# Patient Record
Sex: MALE | Race: WHITE | Employment: OTHER | URBAN - METROPOLITAN AREA
[De-identification: names, ages, dates, MRNs, and addresses within clinical notes are randomized per-mention and may not be internally consistent; named-entity substitution may affect disease eponyms.]

---

## 2017-06-15 PROBLEM — E78.2 MIXED HYPERLIPIDEMIA: Status: ACTIVE | Noted: 2017-06-15

## 2017-06-15 PROBLEM — R63.5 WEIGHT GAIN: Status: ACTIVE | Noted: 2017-06-15

## 2017-06-15 PROBLEM — I10 ESSENTIAL HYPERTENSION: Status: ACTIVE | Noted: 2017-06-15

## 2017-06-15 PROBLEM — E11.8 CONTROLLED DIABETES MELLITUS TYPE 2 WITH COMPLICATIONS (HCC): Status: ACTIVE | Noted: 2017-06-15

## 2017-06-15 PROBLEM — G47.00 INSOMNIA: Status: ACTIVE | Noted: 2017-06-15

## 2017-10-17 PROBLEM — M51.16 LUMBAR DISC DISEASE WITH RADICULOPATHY: Status: ACTIVE | Noted: 2017-10-17

## 2018-03-13 PROBLEM — R63.4 WEIGHT LOSS: Status: ACTIVE | Noted: 2018-03-13

## 2018-10-18 PROBLEM — K64.5 THROMBOSED HEMORRHOIDS: Status: ACTIVE | Noted: 2018-10-18

## 2019-01-23 PROBLEM — R42 DIZZINESS: Status: ACTIVE | Noted: 2019-01-23

## 2019-01-23 PROBLEM — Z91.89 MULTIPLE RISK FACTORS FOR CORONARY ARTERY DISEASE: Status: ACTIVE | Noted: 2019-01-23

## 2019-04-16 ENCOUNTER — HOSPITAL ENCOUNTER (OUTPATIENT)
Dept: MRI IMAGING | Age: 74
Discharge: HOME | End: 2019-04-16
Attending: INTERNAL MEDICINE
Payer: MEDICARE

## 2019-04-16 DIAGNOSIS — M51.16 LUMBAR DISC DISEASE WITH RADICULOPATHY: ICD-10-CM

## 2019-04-16 PROCEDURE — 72158 MRI LUMBAR SPINE W/O & W/DYE: CPT

## 2019-04-16 PROCEDURE — A9575 INJ GADOTERATE MEGLUMI 0.1ML: HCPCS | Performed by: INTERNAL MEDICINE

## 2019-04-16 PROCEDURE — 74011250636 HC RX REV CODE- 250/636: Performed by: INTERNAL MEDICINE

## 2019-04-16 RX ORDER — GADOTERATE MEGLUMINE 376.9 MG/ML
21 INJECTION INTRAVENOUS
Status: COMPLETED | OUTPATIENT
Start: 2019-04-16 | End: 2019-04-16

## 2019-04-16 RX ORDER — SODIUM CHLORIDE 0.9 % (FLUSH) 0.9 %
10 SYRINGE (ML) INJECTION
Status: COMPLETED | OUTPATIENT
Start: 2019-04-16 | End: 2019-04-16

## 2019-04-16 RX ADMIN — Medication 10 ML: at 16:17

## 2019-04-16 RX ADMIN — GADOTERATE MEGLUMINE 21 ML: 376.9 INJECTION INTRAVENOUS at 16:17

## 2019-04-23 NOTE — PROGRESS NOTES
Multilevel disc damage with moderate-severe foraminal narrowing and moderate-severe spinal stenosis. Is pt's back pain improving? If not, refer to Neurosurgery.

## 2019-05-02 ENCOUNTER — HOSPITAL ENCOUNTER (OUTPATIENT)
Dept: CT IMAGING | Age: 74
Discharge: HOME | End: 2019-05-02
Attending: PSYCHIATRY & NEUROLOGY
Payer: MEDICARE

## 2019-05-02 ENCOUNTER — HOSPITAL ENCOUNTER (OUTPATIENT)
Dept: INTERVENTIONAL RADIOLOGY/VASCULAR | Age: 74
Discharge: HOME | End: 2019-05-02
Attending: PSYCHIATRY & NEUROLOGY
Payer: MEDICARE

## 2019-05-02 VITALS
SYSTOLIC BLOOD PRESSURE: 188 MMHG | HEIGHT: 69 IN | RESPIRATION RATE: 19 BRPM | HEART RATE: 88 BPM | DIASTOLIC BLOOD PRESSURE: 92 MMHG | WEIGHT: 230 LBS | BODY MASS INDEX: 34.07 KG/M2 | OXYGEN SATURATION: 99 % | TEMPERATURE: 97.7 F

## 2019-05-02 DIAGNOSIS — M54.16 LUMBAR RADICULOPATHY: ICD-10-CM

## 2019-05-02 PROCEDURE — 72132 CT LUMBAR SPINE W/DYE: CPT

## 2019-05-02 PROCEDURE — 74011250636 HC RX REV CODE- 250/636

## 2019-05-02 PROCEDURE — 74011250636 HC RX REV CODE- 250/636: Performed by: PHYSICIAN ASSISTANT

## 2019-05-02 PROCEDURE — 74011636320 HC RX REV CODE- 636/320: Performed by: PHYSICIAN ASSISTANT

## 2019-05-02 PROCEDURE — 62304 MYELOGRAPHY LUMBAR INJECTION: CPT

## 2019-05-02 PROCEDURE — 77030014143 HC TY PUNC LUMBR BD -A

## 2019-05-02 PROCEDURE — 77030003666 HC NDL SPINAL BD -A

## 2019-05-02 RX ORDER — LIDOCAINE HYDROCHLORIDE 20 MG/ML
20-200 INJECTION, SOLUTION INFILTRATION; PERINEURAL
Status: DISCONTINUED | OUTPATIENT
Start: 2019-05-02 | End: 2019-05-06 | Stop reason: HOSPADM

## 2019-05-02 RX ORDER — MORPHINE SULFATE 2 MG/ML
4 INJECTION, SOLUTION INTRAMUSCULAR; INTRAVENOUS ONCE
Status: COMPLETED | OUTPATIENT
Start: 2019-05-02 | End: 2019-05-02

## 2019-05-02 RX ADMIN — IOPAMIDOL 15 ML: 408 INJECTION, SOLUTION INTRATHECAL at 08:56

## 2019-05-02 RX ADMIN — LIDOCAINE HYDROCHLORIDE 100 MG: 20 INJECTION, SOLUTION INFILTRATION; PERINEURAL at 08:48

## 2019-05-02 RX ADMIN — MORPHINE SULFATE 4 MG: 2 INJECTION, SOLUTION INTRAMUSCULAR; INTRAVENOUS at 10:00

## 2019-05-02 NOTE — DISCHARGE INSTRUCTIONS
[address]  Department of Interventional Radiology  [phone] Office  [phone] Fax  MYELOGRAM DISCHARGE INSTRUCTIONS  General Information:  Myelogram:     A Myelogram involves a lumbar puncture, and instead of removing fluid, contrast will be injected into the sac surrounding the spinal column. It is done to visualize the spinal column, nerve roots, spinal canal, vertebral discs and disc space. It is usually done to diagnose back pain with unknown cause or in preparation for surgery. After the injection, a CT scan will be done, usually within two hours of the injection. Call If:     You should call your Physician and/or the Radiology Nurse if you develop a headache that is not relieved by Tylenol, and worsens when you stand and eases when you lie down, you need to call. You may have developed what is referred to as a spinal headache. Our physician's will probably advise you to be on strict bed rest for 24 hours, to drink lots of fluids and caffeine. If this does not help the head pain, call again the next day. You should call if you have bleeding other than a small spot on your bandage. You should call if you have any numbness, tingling, weakness, fever, chills, urinary retention, severe itching, rash, welts, swelling, or confusion. Follow-Up Instructions: See the doctor who ordered your procedure as he/she has instructed. If you had a Lumbar Puncture or Myelogram, your results should be available to your ordering doctor in 3-5 business days. You can remove your dressing in 24 hours and shower regularly. Do not bathe or swim for 72 hours. To Reach Us: If you have any questions about your procedure, please call the Interventional Radiology department at [phone]. After business hours (5pm) and weekends, call the answering service at [phone] and ask for the Radiologist on call to be paged.      Interventional Radiology General Nurse Discharge      * Please give a list of your current medications to your Primary Care Provider. * Please update this list whenever your medications are discontinued, doses are     changed, or new medications (including over-the-counter products) are added. * Please carry medication information at all times in case of emergency situations. These are general instructions for a healthy lifestyle:    No smoking/ No tobacco products/ Avoid exposure to second hand smoke  Surgeon General's Warning:  Quitting smoking now greatly reduces serious risk to your health. Obesity, smoking, and sedentary lifestyle greatly increases your risk for illness  A healthy diet, regular physical exercise & weight monitoring are important for maintaining a healthy lifestyle    You may be retaining fluid if you have a history of heart failure or if you experience any of the following symptoms:  Weight gain of 3 pounds or more overnight or 5 pounds in a week, increased swelling in our hands or feet or shortness of breath while lying flat in bed. Please call your doctor as soon as you notice any of these symptoms; do not wait until your next office visit. Recognize signs and symptoms of STROKE:  F-face looks uneven    A-arms unable to move or move unevenly    S-speech slurred or non-existent    T-time-call 911 as soon as signs and symptoms begin-DO NOT go       Back to bed or wait to see if you get better-TIME IS BRAIN.         Patient Signature:  Date: 5/2/2019

## 2019-05-02 NOTE — PROGRESS NOTES
TRANSFER - OUT REPORT:    Verbal report given to [facility], RN (name) on Alfonso Gallegos  being transferred to  recovery (unit) for routine progression of care       Report consisted of patients Situation, Background, Assessment and   Recommendations(SBAR). Information from the following report(s) Procedure Summary and MAR was reviewed with the receiving nurse. Lines:       Opportunity for questions and clarification was provided.       Patient transported with:   Registered Nurse

## 2019-07-15 PROBLEM — L98.9 SKIN LESION: Status: ACTIVE | Noted: 2019-07-15

## 2019-07-15 PROBLEM — E55.9 VITAMIN D DEFICIENCY: Status: ACTIVE | Noted: 2019-07-15

## 2019-10-15 PROBLEM — Z23 FLU VACCINE NEED: Status: ACTIVE | Noted: 2019-10-15

## 2019-10-15 PROBLEM — N40.0 BPH (BENIGN PROSTATIC HYPERPLASIA): Status: ACTIVE | Noted: 2019-10-15

## 2021-01-07 PROBLEM — E78.5 HYPERLIPIDEMIA: Status: ACTIVE | Noted: 2017-06-15

## 2021-01-07 PROBLEM — R63.5 WEIGHT GAIN: Status: RESOLVED | Noted: 2017-06-15 | Resolved: 2021-01-07

## 2021-01-07 PROBLEM — R63.4 WEIGHT LOSS: Status: RESOLVED | Noted: 2018-03-13 | Resolved: 2021-01-07

## 2021-01-07 PROBLEM — R42 DIZZINESS: Status: RESOLVED | Noted: 2019-01-23 | Resolved: 2021-01-07

## 2021-01-07 PROBLEM — Z23 FLU VACCINE NEED: Status: RESOLVED | Noted: 2019-10-15 | Resolved: 2021-01-07

## 2021-01-07 PROBLEM — K64.5 THROMBOSED HEMORRHOIDS: Status: RESOLVED | Noted: 2018-10-18 | Resolved: 2021-01-07

## 2021-03-12 PROBLEM — E66.01 OBESITY, MORBID (HCC): Status: ACTIVE | Noted: 2021-03-12

## 2021-07-23 PROBLEM — Z71.89 ENCOUNTER FOR DIABETES EDUCATION: Status: ACTIVE | Noted: 2021-07-23

## 2022-03-18 PROBLEM — Z91.89 MULTIPLE RISK FACTORS FOR CORONARY ARTERY DISEASE: Status: ACTIVE | Noted: 2019-01-23

## 2022-03-18 PROBLEM — E78.5 HYPERLIPIDEMIA: Status: ACTIVE | Noted: 2017-06-15

## 2022-03-19 PROBLEM — E55.9 VITAMIN D DEFICIENCY: Status: ACTIVE | Noted: 2019-07-15

## 2022-03-19 PROBLEM — Z71.89 ENCOUNTER FOR DIABETES EDUCATION: Status: ACTIVE | Noted: 2021-07-23

## 2022-03-19 PROBLEM — E66.01 OBESITY, MORBID (HCC): Status: ACTIVE | Noted: 2021-03-12

## 2022-03-19 PROBLEM — N40.0 BPH (BENIGN PROSTATIC HYPERPLASIA): Status: ACTIVE | Noted: 2019-10-15

## 2022-03-19 PROBLEM — G47.00 INSOMNIA: Status: ACTIVE | Noted: 2017-06-15

## 2022-03-19 PROBLEM — M51.16 LUMBAR DISC DISEASE WITH RADICULOPATHY: Status: ACTIVE | Noted: 2017-10-17

## 2022-03-20 PROBLEM — L98.9 SKIN LESION: Status: ACTIVE | Noted: 2019-07-15

## 2022-03-20 PROBLEM — I10 ESSENTIAL HYPERTENSION: Status: ACTIVE | Noted: 2017-06-15

## 2022-07-19 DIAGNOSIS — E78.5 HYPERLIPIDEMIA, UNSPECIFIED HYPERLIPIDEMIA TYPE: ICD-10-CM

## 2022-07-21 ENCOUNTER — NURSE ONLY (OUTPATIENT)
Dept: INTERNAL MEDICINE CLINIC | Facility: CLINIC | Age: 77
End: 2022-07-21

## 2022-07-21 DIAGNOSIS — E78.5 HYPERLIPIDEMIA, UNSPECIFIED HYPERLIPIDEMIA TYPE: ICD-10-CM

## 2022-07-22 LAB
ALT SERPL-CCNC: 53 U/L (ref 12–65)
ANION GAP SERPL CALC-SCNC: 9 MMOL/L (ref 7–16)
BUN SERPL-MCNC: 25 MG/DL (ref 8–23)
CALCIUM SERPL-MCNC: 9.7 MG/DL (ref 8.3–10.4)
CHLORIDE SERPL-SCNC: 110 MMOL/L (ref 98–107)
CHOLEST SERPL-MCNC: 99 MG/DL
CO2 SERPL-SCNC: 21 MMOL/L (ref 21–32)
CREAT SERPL-MCNC: 1.2 MG/DL (ref 0.8–1.5)
EST. AVERAGE GLUCOSE BLD GHB EST-MCNC: 197 MG/DL
GLUCOSE SERPL-MCNC: 252 MG/DL (ref 65–100)
HBA1C MFR BLD: 8.5 % (ref 4.8–5.6)
HDLC SERPL-MCNC: 33 MG/DL (ref 40–60)
HDLC SERPL: 3 {RATIO}
LDLC SERPL CALC-MCNC: 36.6 MG/DL
POTASSIUM SERPL-SCNC: 4.6 MMOL/L (ref 3.5–5.1)
SODIUM SERPL-SCNC: 140 MMOL/L (ref 136–145)
TRIGL SERPL-MCNC: 147 MG/DL (ref 35–150)
VLDLC SERPL CALC-MCNC: 29.4 MG/DL (ref 6–23)

## 2022-07-28 ENCOUNTER — TELEPHONE (OUTPATIENT)
Dept: INTERNAL MEDICINE CLINIC | Facility: CLINIC | Age: 77
End: 2022-07-28

## 2022-07-28 ENCOUNTER — OFFICE VISIT (OUTPATIENT)
Dept: INTERNAL MEDICINE CLINIC | Facility: CLINIC | Age: 77
End: 2022-07-28
Payer: MEDICARE

## 2022-07-28 VITALS
OXYGEN SATURATION: 97 % | DIASTOLIC BLOOD PRESSURE: 84 MMHG | HEART RATE: 88 BPM | BODY MASS INDEX: 34.07 KG/M2 | SYSTOLIC BLOOD PRESSURE: 132 MMHG | TEMPERATURE: 97.6 F | WEIGHT: 230 LBS | HEIGHT: 69 IN

## 2022-07-28 PROCEDURE — 1123F ACP DISCUSS/DSCN MKR DOCD: CPT | Performed by: INTERNAL MEDICINE

## 2022-07-28 PROCEDURE — 2022F DILAT RTA XM EVC RTNOPTHY: CPT | Performed by: INTERNAL MEDICINE

## 2022-07-28 PROCEDURE — G8427 DOCREV CUR MEDS BY ELIG CLIN: HCPCS | Performed by: INTERNAL MEDICINE

## 2022-07-28 PROCEDURE — 1036F TOBACCO NON-USER: CPT | Performed by: INTERNAL MEDICINE

## 2022-07-28 PROCEDURE — 3052F HG A1C>EQUAL 8.0%<EQUAL 9.0%: CPT | Performed by: INTERNAL MEDICINE

## 2022-07-28 PROCEDURE — G8417 CALC BMI ABV UP PARAM F/U: HCPCS | Performed by: INTERNAL MEDICINE

## 2022-07-28 PROCEDURE — 99213 OFFICE O/P EST LOW 20 MIN: CPT | Performed by: INTERNAL MEDICINE

## 2022-07-28 PROCEDURE — 3017F COLORECTAL CA SCREEN DOC REV: CPT | Performed by: INTERNAL MEDICINE

## 2022-07-28 RX ORDER — REPAGLINIDE 0.5 MG/1
0.5 TABLET ORAL
Qty: 90 TABLET | Refills: 5 | Status: SHIPPED | OUTPATIENT
Start: 2022-07-28 | End: 2022-07-28

## 2022-07-28 RX ORDER — REPAGLINIDE 0.5 MG/1
1 TABLET ORAL
Qty: 120 TABLET | Refills: 5 | Status: SHIPPED | OUTPATIENT
Start: 2022-07-28 | End: 2022-07-29 | Stop reason: SDUPTHER

## 2022-07-28 ASSESSMENT — ENCOUNTER SYMPTOMS
CHEST TIGHTNESS: 0
SHORTNESS OF BREATH: 0

## 2022-07-28 NOTE — TELEPHONE ENCOUNTER
[facility] [phone] pharmacy called. Says there were 2 prescriptions sent in for same medication with different instructions.     Don    Also have qustions regarding dosing

## 2022-07-28 NOTE — TELEPHONE ENCOUNTER
Pharmacy needsClarification on directions and quantity for days supply for Prandin.  Request prescription be resent

## 2022-07-28 NOTE — PROGRESS NOTES
ASSESSMENT/PLAN:    1. Uncontrolled type 2 diabetes mellitus with complication, without long-term current use of insulin (HCC)     - repaglinide (PRANDIN) 0.5 MG tablet; Take 2 tablets by mouth in the morning and 2 tablets at noon and 2 tablets in the evening. Take before meals. Dispense: 120 tablet; Refill: 5    1. Continue chronic medications as prescribed w/ the following Rx changes: uncontrolled, changes made today: Freestyle Vic sensor- Glucose trend with 170s and 180s in mornings. After 12PM, blood sugars stabilize with more reasonable blood sugars in the lower 110s-140s. He likes using the  CGM sensor. Emphasized, continued efforts with limiting carbs, sweets and continued positive lifestyle modifications recommended,  CGM discussion and expectations of data to be obtained and review to control blood sugar. Continue Janumet 50/1000 mg to BID. Increase Prandin to 1 mg BID with meals- Monitor for hypoglycemia, and precautions. If no improvement in 4 weeks, I would like to try SGLT2 in combination with Metformin. I will have him follow up with [facility]      2. Education: Reviewed ABCs of diabetes management (respective goals in parentheses):  A1C (<7), blood pressure (<130/80), and cholesterol (LDL <100). 3.  Compliance at present is estimated to be good. Efforts to improve compliance (if necessary) will be directed at increased exercise, dietary modifications: A healthy diet to consider is a more mediterranean diet which is abundant in fruits, vegetables, whole grains, legumes and olive oil. It features fish and poultry, lean sources of protein over red meat. The importance of a healthy lifestyle are discussed. Limit high fructose containing foods, moderate portion sizes,  regular cardiovascular exercise (walking, swimming, aerobics) for 30-45 minutes, 3-4 times weekly. , regular blood sugar monitoring: daily.   4.  Monitor blood sugar at home daily and keep record to bring to next appointment. 5.  Discussion/Counseling provided today include: interpretation of lab results, blood sugar goals, complications of diabetes mellitus, nutrition and annual eye exams and diabetic foot care precautions. Evaluation and management of the chronic condition(s) delineated. No negative side effects reported. I have reviewed all the lab results. There are some abnormalities that are either expected or not critical to the patient's health, and are discussed in the office today and are addressed. Please refer to the above assessement and plan narrative and orders and follow up plan. Medication discussed and refilled as needed. Physical exam findings are stable unless otherwise indicated and this is addressed. The most recent lab work and imaging and consultant/urgent care visits and imaging are reviewed and discussed and considered during this visit encounter. 1. Uncontrolled type 2 diabetes mellitus with complication, without long-term current use of insulin (HCC)  -     repaglinide (PRANDIN) 0.5 MG tablet; Take 2 tablets by mouth in the morning and 2 tablets at noon and 2 tablets in the evening. Take before meals. , Disp-120 tablet, R-5Normal  -     Microalbumin / creatinine urine ratio; Future       On this date, 22, I have spent 20 minutes reviewing previous notes, test results and face to face with the patient discussing the diagnosis and importance of compliance with the treatment plan as well as documenting on the day of the visit. An electronic signature was used to authenticate this note. -- Katarzyna Guerrero MD     Return in about 4 weeks (around 2022) for follow up with PA for DM check.     SUBJECTIVE/OBJECTIVE:    HPI:   Dedra Henderson (:  is a 78 y.o. male, here for evaluation of the following chief complaint(s):   Chief Complaint   Patient presents with    Diabetes     6 month recheck    Hypertension    Cholesterol Problem    Discuss Labs     Diabetes - Diet: attempting to follow diabetic diet  Lab Results   Component Value Date    LABA1C 8.5 (H) 07/21/2022     Lab Results   Component Value Date     07/21/2022       Current diabetic medications include:   Key Antihyperglycemic Medications            repaglinide (PRANDIN) 0.5 MG tablet (Taking)    Sig - Route: Take 2 tablets by mouth in the morning and 2 tablets at noon and 2 tablets in the evening. Take before meals. - Oral    sitaGLIPtan-metFORMIN (JANUMET)  MG per tablet (Taking)    Class: Historical Med            The patient is a 78 y.o. male who is seen for follow up of diabetes. Current monitoring regimen: BGs are high in the morning, symptomatic hypoglycemia does not occur, no diet changes. Taking Janumet 50/1000 mg BID with meals and Prandin 0.5 mg BID. A1c has trended down some over the last year but still not controlled. .  Glucose monitoring frequency: CGM   Home blood sugar records:  [facility] CGM reviewed  Any episodes of hypoglycemia? no  Known diabetic complications: none     Current Eye Exam (within one year): Yes  Current Urine Microalbumin: Yes,   Is She on ACE inhibitor or angiotensin II receptor blocker? Yes - ramipril (Altace)  Current Foot Exam (within one year): Yes    Weight trend: stable  Prior visit with dietician: Has met with [facility] in the past   Current diet: meals per day on average: well balanced, on average, 2 meals per day, Eats vital and eggs in morning and says he has baked chicken or fish and a salad for dinner and walks for exercise daily and cannot figure out why his blood sugars are not better controlled? Current exercise: walking        Labs reviewed and discussed and medication refilled as needed for chronic medications during ov or adjusted based on lab results and/or our discussion as appropriate. See discussion. The patient's available records and electronic chart records are reviewed.   The PMH, PSH, medications, allergies, medications, FH, health maintenance and vaccination status are all reviewed and updated as appropriate. Records from outside providers have been reviewed, summarized, and considered as noted in the history of present illness, past medical history, and objective data of this note and encounter. Health Maintenance   Topic Date Due    Pneumococcal 65+ years Vaccine (1 - PCV) 08/14/1951    Hepatitis C screen  Never done    Shingles vaccine (1 of 2) Never done    COVID-19 Vaccine (4 - Booster) 06/28/2021    Diabetic microalbuminuria test  03/05/2022    Diabetic retinal exam  08/09/2022    Flu vaccine (1) 09/01/2022    Diabetic foot exam  01/10/2023    Depression Screen  01/10/2023    Annual Wellness Visit (AWV)  01/11/2023    A1C test (Diabetic or Prediabetic)  07/21/2023    Lipids  07/21/2023    DTaP/Tdap/Td vaccine (2 - Td or Tdap) 01/07/2025    Colorectal Cancer Screen  01/01/2026    Hepatitis A vaccine  Aged Out    Hib vaccine  Aged Out    Meningococcal (ACWY) vaccine  Aged Out     Patient Active Problem List   Diagnosis    Hyperlipidemia    Multiple risk factors for coronary artery disease    Vitamin D deficiency    Encounter for diabetes education    Insomnia    Uncontrolled type 2 diabetes mellitus with complication, without long-term current use of insulin (HCC)    Obesity, morbid ([address]    BPH (benign prostatic hyperplasia)    Lumbar disc disease with radiculopathy    Essential hypertension    Skin lesion       Review of Systems   Constitutional:  Negative for activity change and fatigue. Eyes:  Negative for visual disturbance. Respiratory:  Negative for chest tightness and shortness of breath. Cardiovascular:  Negative for chest pain, palpitations and leg swelling. Endocrine: Negative for polydipsia and polyuria. Genitourinary:  Negative for dysuria. Musculoskeletal:  Negative for myalgias. Skin:  Negative for wound. Neurological:  Negative for headaches.    Psychiatric/Behavioral:  Negative for dysphoric mood.      Lab Results   Component Value Date/Time    WBC 8.5 01/03/2022 09:31 AM    HGB 14.1 01/03/2022 09:31 AM    HCT 42.7 01/03/2022 09:31 AM    MCV 96 01/03/2022 09:31 AM    RDW 12.1 01/03/2022 09:31 AM     01/03/2022 09:31 AM    NEUTOPHILPCT 61 01/03/2022 09:31 AM    LYMPHOPCT 30 01/03/2022 09:31 AM    MONOPCT 7 01/03/2022 09:31 AM    EOSRELPCT 1 01/03/2022 09:31 AM    BASOPCT 0 01/03/2022 09:31 AM    LYMPHSABS 2.6 01/03/2022 09:31 AM    MONOSABS 0.6 01/03/2022 09:31 AM    EOSABS 0.1 01/03/2022 09:31 AM    BASOSABS 0.0 01/03/2022 09:31 AM    IMMGRAN 1 01/03/2022 09:31 AM    GRANULOCYTEABSOLUTECOUNT 0.1 01/03/2022 09:31 AM       Lab Results   Component Value Date/Time     07/21/2022 01:47 PM    K 4.6 07/21/2022 01:47 PM     07/21/2022 01:47 PM    CO2 21 07/21/2022 01:47 PM    ANIONGAP 9 07/21/2022 01:47 PM    GLUCOSE 252 07/21/2022 01:47 PM    BUN 25 07/21/2022 01:47 PM    CREATININE 1.20 07/21/2022 01:47 PM    GFRAA >60 07/21/2022 01:47 PM    LABGLOM >60 07/21/2022 01:47 PM    CALCIUM 9.7 07/21/2022 01:47 PM    BILITOT 0.5 01/03/2022 09:31 AM    ALT 53 07/21/2022 01:47 PM    AST 24 01/03/2022 09:31 AM    ALKPHOS 65 01/03/2022 09:31 AM    PROT 6.6 01/03/2022 09:31 AM    LABALBU 4.3 01/03/2022 09:31 AM       Lab Results   Component Value Date/Time    CHOL 99 07/21/2022 01:47 PM    HDL 33 07/21/2022 01:47 PM    TRIG 147 07/21/2022 01:47 PM    LDLCALC 36.6 07/21/2022 01:47 PM    VLDL 23 01/03/2022 09:31 AM       Lab Results   Component Value Date/Time    LABA1C 8.5 07/21/2022 01:47 PM    LABA1C 8.3 01/03/2022 09:31 AM    LABA1C 8.9 07/15/2021 08:09 AM    LABA1C 8.9 03/05/2021 11:01 AM    LABA1C 9.8 05/22/2020 03:43 PM       Lab Results   Component Value Date/Time    TSH 1.970 05/22/2020 03:43 PM    TSH 2.970 02/11/2020 09:17 AM    TSH 2.100 07/10/2019 09:08 AM       Lab Results   Component Value Date/Time    PSA 1.7 02/11/2020 09:17 AM       Lab Results   Component Value Date/Time    SPECGRAV 1.022 01/03/2022 09:31 AM    PHUR 5.5 01/03/2022 09:31 AM    COLORU Yellow 01/03/2022 09:31 AM    CLARITYU Clear 01/03/2022 09:31 AM    LEUKOCYTESUR Negative 01/03/2022 09:31 AM    PROTEINU Negative 01/03/2022 09:31 AM    GLUCOSEU 3+ 01/03/2022 09:31 AM    KETUA Negative 01/03/2022 09:31 AM    BLOODU Negative 01/03/2022 09:31 AM    BILIRUBINUR Negative 01/03/2022 09:31 AM    UROBILINOGEN 0.2 01/03/2022 09:31 AM    NITRU Negative 01/03/2022 09:31 AM    LABMICR Comment 01/03/2022 09:31 AM           Vitals:    07/28/22 0815   BP: 132/84   Site: Left Upper Arm   Position: Sitting   Cuff Size: Small Adult   Pulse: 88   Temp: 97.6 °F (36.4 °C)   TempSrc: Temporal   SpO2: 97%   Weight: 230 lb (104.3 kg)   Height: 5' 8.5\" (1.74 m)     Wt Readings from Last 3 Encounters:   07/28/22 230 lb (104.3 kg)   01/10/22 230 lb (104.3 kg)   08/06/21 228 lb (103.4 kg)     BP Readings from Last 3 Encounters:   07/28/22 132/84   01/10/22 138/82   08/06/21 130/70     Physical Exam  Vitals and nursing note reviewed. Constitutional:       Appearance: Normal appearance. He is not ill-appearing. HENT:      Head: Normocephalic and atraumatic. Eyes:      Extraocular Movements: Extraocular movements intact. Conjunctiva/sclera: Conjunctivae normal.   Cardiovascular:      Rate and Rhythm: Normal rate. Pulmonary:      Effort: Pulmonary effort is normal.   Neurological:      General: No focal deficit present. Mental Status: He is alert and oriented to person, place, and time. Mental status is at baseline.    Psychiatric:         Mood and Affect: Mood normal.         Behavior: Behavior normal.

## 2022-07-29 RX ORDER — REPAGLINIDE 0.5 MG/1
1 TABLET ORAL
Qty: 120 TABLET | Refills: 5 | Status: SHIPPED | OUTPATIENT
Start: 2022-07-29

## 2022-08-30 ENCOUNTER — OFFICE VISIT (OUTPATIENT)
Dept: INTERNAL MEDICINE CLINIC | Facility: CLINIC | Age: 77
End: 2022-08-30
Payer: COMMERCIAL

## 2022-08-30 VITALS
SYSTOLIC BLOOD PRESSURE: 150 MMHG | HEART RATE: 79 BPM | DIASTOLIC BLOOD PRESSURE: 88 MMHG | HEIGHT: 69 IN | BODY MASS INDEX: 34.07 KG/M2 | OXYGEN SATURATION: 97 % | TEMPERATURE: 97 F | WEIGHT: 230 LBS

## 2022-08-30 DIAGNOSIS — I10 ELEVATED BLOOD PRESSURE READING IN OFFICE WITH DIAGNOSIS OF HYPERTENSION: ICD-10-CM

## 2022-08-30 DIAGNOSIS — E11.9 TYPE 2 DIABETES MELLITUS WITHOUT COMPLICATION, WITHOUT LONG-TERM CURRENT USE OF INSULIN (HCC): Primary | ICD-10-CM

## 2022-08-30 DIAGNOSIS — E66.1 CLASS 1 DRUG-INDUCED OBESITY WITH SERIOUS COMORBIDITY AND BODY MASS INDEX (BMI) OF 34.0 TO 34.9 IN ADULT: ICD-10-CM

## 2022-08-30 PROCEDURE — 3052F HG A1C>EQUAL 8.0%<EQUAL 9.0%: CPT | Performed by: PHYSICIAN ASSISTANT

## 2022-08-30 PROCEDURE — 3017F COLORECTAL CA SCREEN DOC REV: CPT | Performed by: PHYSICIAN ASSISTANT

## 2022-08-30 PROCEDURE — 1123F ACP DISCUSS/DSCN MKR DOCD: CPT | Performed by: PHYSICIAN ASSISTANT

## 2022-08-30 PROCEDURE — 1036F TOBACCO NON-USER: CPT | Performed by: PHYSICIAN ASSISTANT

## 2022-08-30 PROCEDURE — G8427 DOCREV CUR MEDS BY ELIG CLIN: HCPCS | Performed by: PHYSICIAN ASSISTANT

## 2022-08-30 PROCEDURE — G8417 CALC BMI ABV UP PARAM F/U: HCPCS | Performed by: PHYSICIAN ASSISTANT

## 2022-08-30 PROCEDURE — 2022F DILAT RTA XM EVC RTNOPTHY: CPT | Performed by: PHYSICIAN ASSISTANT

## 2022-08-30 PROCEDURE — 99215 OFFICE O/P EST HI 40 MIN: CPT | Performed by: PHYSICIAN ASSISTANT

## 2022-08-30 RX ORDER — METFORMIN HYDROCHLORIDE 500 MG/1
500 TABLET, EXTENDED RELEASE ORAL 2 TIMES DAILY WITH MEALS
Qty: 60 TABLET | Refills: 2 | Status: SHIPPED | OUTPATIENT
Start: 2022-08-30

## 2022-08-30 ASSESSMENT — PATIENT HEALTH QUESTIONNAIRE - PHQ9
SUM OF ALL RESPONSES TO PHQ QUESTIONS 1-9: 0
SUM OF ALL RESPONSES TO PHQ QUESTIONS 1-9: 0
SUM OF ALL RESPONSES TO PHQ9 QUESTIONS 1 & 2: 0
SUM OF ALL RESPONSES TO PHQ QUESTIONS 1-9: 0
SUM OF ALL RESPONSES TO PHQ QUESTIONS 1-9: 0
2. FEELING DOWN, DEPRESSED OR HOPELESS: 0
1. LITTLE INTEREST OR PLEASURE IN DOING THINGS: 0

## 2022-08-30 ASSESSMENT — ENCOUNTER SYMPTOMS: SHORTNESS OF BREATH: 0

## 2022-08-30 NOTE — PATIENT INSTRUCTIONS
Discontinue Janumet once current supply is finished  Replace with Rybelsus 3 mg once daily in the morning + Metformin  mg twice daily with meals  Advised to increase to Metformin ER 1000 mg (2 x 500 mg tablets) twice daily if no diarrhea or stomach problems occur on the lower dose  Reviewed specific dosing instructions for Rybelsus including taking it on an empty stomach when first waking up, swallow with a small amount of water - no more than 4 oz, and wait 30 minutes before eating any food, drinking beverages, or taking other medications  Monitor blood sugar daily and keep record to bring to next appointment  Continue chronic medications as prescribed  Suggest moving daily honey dose to earlier in the day - avoid evening hours after dinner  Recommend reduction of ice cream intake frequency but also need to switch to lower carbohydrate option like greek yogurt bars (16 g of carb vs 50 g in bars he's been eating) and have it within an hour of dinner

## 2022-08-30 NOTE — PROGRESS NOTES
Preeti Mcdermott (:  ) is a 78 y.o. male,Established patient, here for evaluation of the following chief complaint(s):  Follow-up (Diabetes)         ASSESSMENT/PLAN:  1. Type 2 diabetes mellitus without complication, without long-term current use of insulin (HCC)  2. Elevated blood pressure reading in office with diagnosis of hypertension  3. Class 1 drug-induced obesity with serious comorbidity and body mass index (BMI) of 34.0 to 34.9 in adult      Patient Instructions   Discontinue Janumet once current supply is finished  Replace with Rybelsus 3 mg once daily in the morning + Metformin  mg twice daily with meals  Advised to increase to Metformin ER 1000 mg (2 x 500 mg tablets) twice daily if no diarrhea or stomach problems occur on the lower dose  Reviewed specific dosing instructions for Rybelsus including taking it on an empty stomach when first waking up, swallow with a small amount of water - no more than 4 oz, and wait 30 minutes before eating any food, drinking beverages, or taking other medications  Monitor blood sugar daily and keep record to bring to next appointment  Continue chronic medications as prescribed  Suggest moving daily honey dose to earlier in the day - avoid evening hours after dinner  Recommend reduction of ice cream intake frequency but also need to switch to lower carbohydrate option like greek yogurt bars (16 g of carb vs 50 g in bars he's been eating) and have it within an hour of dinner      Return in about 1 month (around 2022) for diabetes f/u. Subjective   SUBJECTIVE/OBJECTIVE:  The patient is a 78 y.o. male who is seen for follow up of diabetes. Current monitoring regimen: BGs are high in the morning.   Glucose monitoring frequency: 1 times daily  Home blood sugar records: fasting range: 220-230, pre-dinner range: 110-130  Any episodes of hypoglycemia? no  Known diabetic complications: peripheral neuropathy  Current diabetic medications include: oral agents (triple therapy): repaglinide (Prandin) 1 mg (2 x 0.5 mg tablets) bid with meals - has been more consistent with taking this med over the past month, combination: Janumet 50/1000 mg bid. Previous treatments tried: plain Metformin caused diarrhea, Actos in 2017    Current Eye Exam (within one year): Yes - Aug 2022  Current Urine Microalbumin: No, normal - Mar 2021  Is She on ACE inhibitor or angiotensin II receptor blocker? Yes - ramipril (Altace)  Current Foot Exam (within one year): Yes - Jan 2022      Weight trend: stable  Prior visit with dietician: no  Current diet: meals per day on average: 2; restricting intake of the following: carbohydrates; admits to [employer] ice cream bar frequently in the late evenings  Current exercise: walking        Last HbA1C:    Lab Results   Component Value Date    LABA1C 8.5 (H) 07/21/2022     Lab Results   Component Value Date     07/21/2022           Last Lipid Panel:   Lab Results   Component Value Date    CHOL 99 07/21/2022    CHOL 108 01/03/2022    CHOL 107 03/05/2021     Lab Results   Component Value Date    TRIG 147 07/21/2022    TRIG 126 01/03/2022    TRIG 105 03/05/2021     Lab Results   Component Value Date    HDL 33 (L) 07/21/2022    HDL 35 (L) 01/03/2022    HDL 38 (L) 03/05/2021     Lab Results   Component Value Date    LDLCALC 36.6 07/21/2022    LDLCALC 50 01/03/2022    LDLCALC 49 03/05/2021     Lab Results   Component Value Date    LABVLDL 29.4 (H) 07/21/2022    LABVLDL 21 05/22/2020    LABVLDL 27 02/11/2020    VLDL 23 01/03/2022    VLDL 20 03/05/2021     Lab Results   Component Value Date    CHOLHDLRATIO 3.0 07/21/2022        Review of Systems   Constitutional:  Negative for fatigue and unexpected weight change. Respiratory:  Negative for shortness of breath. Cardiovascular:  Negative for chest pain, palpitations and leg swelling. Endocrine: Negative for polydipsia and polyuria. Musculoskeletal:  Negative for myalgias. Neurological:  Negative for dizziness, numbness and headaches. BP (!) 150/85 (Site: Right Upper Arm, Position: Sitting, Cuff Size: Small Adult)   Pulse 79   Temp 97 °F (36.1 °C) (Temporal)   Ht 5' 8.5\" (1.74 m)   Wt 230 lb (104.3 kg)   SpO2 97%   BMI 34.46 kg/m²       BP (!) 150/88   Pulse 79   Temp 97 °F (36.1 °C) (Temporal)   Ht 5' 8.5\" (1.74 m)   Wt 230 lb (104.3 kg)   SpO2 97%   BMI 34.46 kg/m²       Objective   Physical Exam  Constitutional:       Appearance: Normal appearance. HENT:      Head: Normocephalic and atraumatic. Eyes:      Conjunctiva/sclera: Conjunctivae normal.      Pupils: Pupils are equal, round, and reactive to light. Neck:      Vascular: No carotid bruit. Cardiovascular:      Rate and Rhythm: Normal rate and regular rhythm. Heart sounds: Normal heart sounds. Pulmonary:      Effort: Pulmonary effort is normal.      Breath sounds: Normal breath sounds. Musculoskeletal:         General: Normal range of motion. Cervical back: Normal range of motion. Skin:     General: Skin is warm and dry. Neurological:      Mental Status: He is alert and oriented to person, place, and time. Psychiatric:         Mood and Affect: Mood normal.         Behavior: Behavior normal.         Thought Content: Thought content normal.         Judgment: Judgment normal.          On this date 8/30/2022 I have spent 60 minutes reviewing previous notes, test results and face to face with the patient discussing the diagnosis and importance of compliance with the treatment plan as well as documenting on the day of the visit. An electronic signature was used to authenticate this note.     --Ty Rey PA-C

## 2022-09-07 ENCOUNTER — TELEPHONE (OUTPATIENT)
Dept: INTERNAL MEDICINE CLINIC | Facility: CLINIC | Age: 77
End: 2022-09-07

## 2022-09-07 NOTE — TELEPHONE ENCOUNTER
Pt was instructed to schedule a 1 month f/u with you after his visit on 8/30/22 for a DM med re-check. No appts available. Please advise.

## 2022-09-07 NOTE — TELEPHONE ENCOUNTER
----- Message from Rianna Geller sent at 9/7/2022  9:24 AM EDT -----  Subject: Appointment Request    Reason for Call: Established Patient Appointment needed: Routine Existing   Condition Follow Up (Diabetes)    QUESTIONS    Reason for appointment request? No appointments available during search     Additional Information for Provider?  Patient was instructed to follow up   in one month after started new diabetic medication No appointment showing   ---------------------------------------------------------------------------  --------------  [id number] [employer]  [phone]; OK to leave message on voicemail  ---------------------------------------------------------------------------  --------------  SCRIPT ANSWERS  COVID Screen: Livia Jean-Baptiste

## 2022-09-08 NOTE — TELEPHONE ENCOUNTER
Pt sent a [employer] msg today requesting an appt and I called him because there was a cancellation on Sept 20 and he declined that appt. He states that is too soon and he wants to be worked in later in the month or the first part of October but before October 7. Please advise.

## 2022-09-08 NOTE — TELEPHONE ENCOUNTER
He is trying to coordinate his appointment to be 30 days after starting a new medication but was finishing up his old medication first so that's what the dilemma is. Due to a scheduled conference I am attending the first week of October, the only option I have is working him in at 2:30 on Monday, Sept 26 or giving some additional samples and waiting until the week I get back and could do a 2:30 on Monday, Oct 10 or Thursday, Oct 13.

## 2022-09-26 ENCOUNTER — OFFICE VISIT (OUTPATIENT)
Dept: INTERNAL MEDICINE CLINIC | Facility: CLINIC | Age: 77
End: 2022-09-26
Payer: COMMERCIAL

## 2022-09-26 VITALS
OXYGEN SATURATION: 98 % | HEIGHT: 69 IN | WEIGHT: 229 LBS | BODY MASS INDEX: 33.92 KG/M2 | TEMPERATURE: 97.3 F | SYSTOLIC BLOOD PRESSURE: 132 MMHG | DIASTOLIC BLOOD PRESSURE: 80 MMHG | HEART RATE: 76 BPM

## 2022-09-26 DIAGNOSIS — E11.40 TYPE 2 DIABETES MELLITUS WITH DIABETIC NEUROPATHY, WITHOUT LONG-TERM CURRENT USE OF INSULIN (HCC): Primary | ICD-10-CM

## 2022-09-26 DIAGNOSIS — Z23 NEED FOR INFLUENZA VACCINATION: ICD-10-CM

## 2022-09-26 PROCEDURE — 3052F HG A1C>EQUAL 8.0%<EQUAL 9.0%: CPT | Performed by: PHYSICIAN ASSISTANT

## 2022-09-26 PROCEDURE — 1123F ACP DISCUSS/DSCN MKR DOCD: CPT | Performed by: PHYSICIAN ASSISTANT

## 2022-09-26 PROCEDURE — 90694 VACC AIIV4 NO PRSRV 0.5ML IM: CPT | Performed by: PHYSICIAN ASSISTANT

## 2022-09-26 PROCEDURE — 90471 IMMUNIZATION ADMIN: CPT | Performed by: PHYSICIAN ASSISTANT

## 2022-09-26 PROCEDURE — 99214 OFFICE O/P EST MOD 30 MIN: CPT | Performed by: PHYSICIAN ASSISTANT

## 2022-09-26 ASSESSMENT — ENCOUNTER SYMPTOMS: SHORTNESS OF BREATH: 0

## 2022-09-26 ASSESSMENT — PATIENT HEALTH QUESTIONNAIRE - PHQ9
SUM OF ALL RESPONSES TO PHQ9 QUESTIONS 1 & 2: 0
SUM OF ALL RESPONSES TO PHQ QUESTIONS 1-9: 0
SUM OF ALL RESPONSES TO PHQ QUESTIONS 1-9: 0
1. LITTLE INTEREST OR PLEASURE IN DOING THINGS: 0
SUM OF ALL RESPONSES TO PHQ QUESTIONS 1-9: 0
SUM OF ALL RESPONSES TO PHQ QUESTIONS 1-9: 0
2. FEELING DOWN, DEPRESSED OR HOPELESS: 0

## 2022-09-26 NOTE — PROGRESS NOTES
Germain Fernández (:  ) is a 78 y.o. male,Established patient, here for evaluation of the following chief complaint(s):  Follow-up (Diabetes)         ASSESSMENT/PLAN:  1. Type 2 diabetes mellitus with diabetic neuropathy, without long-term current use of insulin (HCC)  -     CBC with Auto Differential; Future  -     Comprehensive Metabolic Panel; Future  -     Lipid Panel; Future  -     Hemoglobin A1C; Future  2. Need for influenza vaccination  -     Influenza, FLUAD, (age 72 y+), IM, Preservative Free, 0.5 mL      Patient Instructions   Discontinue Rybelsus  Switch to Ozempic 0.5 mg weekly x 4 weeks then increase to 1 mg weekly (use 2 x 0.5 mg doses of sample pen) until used up  Reduce Metformin ER to once daily with largest meal  Counseled that while these medications do usually work well it will take time for them to get to optimal dosing and efficacy  Monitor blood sugar daily and keep record to bring to next appointment  Continue chronic medications as prescribed  Suggest eating something (greek yogurt, protein drink, peanut butter crackers) early when he first wakes up prior to exercising to avoid glucose spikes in the morning  Monitor blood pressure 2 times/week and keep record to bring to next appointment      Return in about 5 weeks (around 10/31/2022) for diabetes f/u. Subjective   SUBJECTIVE/OBJECTIVE:  The patient is a 78 y.o. male who is seen for follow up of diabetes. Current monitoring regimen: BGs are high in the morning. Glucose monitoring frequency: 3 times daily  Home blood sugar records: fasting range: 240-290, mid-afternoon/pre-dinner range: 140-160,  dinner postprandial range: 160s  Any episodes of hypoglycemia? no  Known diabetic complications: peripheral neuropathy  Current diabetic medications include: oral agents (triple therapy): Glucophage  mg bid, repaglinide (Prandin) 1 mg (2 x 0.5 mg tablets) bid with meals and Rybelsus 3 mg daily.   He describes frequent liquid stools in the evening after taking 2nd dose of Metformin   Previous treatments tried: plain Metformin caused diarrhea, Actos in 2017    Last HbA1C:    Lab Results   Component Value Date    LABA1C 8.5 (H) 07/21/2022     Lab Results   Component Value Date     07/21/2022         Last Lipid Panel:   Lab Results   Component Value Date    CHOL 99 07/21/2022    CHOL 108 01/03/2022    CHOL 107 03/05/2021     Lab Results   Component Value Date    TRIG 147 07/21/2022    TRIG 126 01/03/2022    TRIG 105 03/05/2021     Lab Results   Component Value Date    HDL 33 (L) 07/21/2022    HDL 35 (L) 01/03/2022    HDL 38 (L) 03/05/2021     Lab Results   Component Value Date    LDLCALC 36.6 07/21/2022    LDLCALC 50 01/03/2022    LDLCALC 49 03/05/2021     Lab Results   Component Value Date    LABVLDL 29.4 (H) 07/21/2022    LABVLDL 21 05/22/2020    LABVLDL 27 02/11/2020    VLDL 23 01/03/2022    VLDL 20 03/05/2021     Lab Results   Component Value Date    CHOLHDLRATIO 3.0 07/21/2022          Review of Systems   Constitutional:  Negative for fatigue and unexpected weight change. Eyes:  Positive for visual disturbance. Respiratory:  Negative for shortness of breath. Cardiovascular:  Negative for chest pain, palpitations and leg swelling. Endocrine: Negative for polydipsia. Negative for hair loss   Genitourinary:  Negative for frequency. Musculoskeletal:  Negative for myalgias. Neurological:  Negative for dizziness, numbness and headaches. BP (!) 140/90 (Site: Left Upper Arm, Position: Sitting, Cuff Size: Small Adult)   Pulse 76   Temp 97.3 °F (36.3 °C) (Temporal)   Ht 5' 8.5\" (1.74 m)   Wt 229 lb (103.9 kg)   SpO2 98%   BMI 34.31 kg/m²       /80   Pulse 76   Temp 97.3 °F (36.3 °C) (Temporal)   Ht 5' 8.5\" (1.74 m)   Wt 229 lb (103.9 kg)   SpO2 98%   BMI 34.31 kg/m²       Objective   Physical Exam  Constitutional:       Appearance: Normal appearance.    HENT:      Head: Normocephalic and atraumatic. Eyes:      Conjunctiva/sclera: Conjunctivae normal.      Pupils: Pupils are equal, round, and reactive to light. Neck:      Vascular: No carotid bruit. Cardiovascular:      Rate and Rhythm: Normal rate and regular rhythm. Heart sounds: Normal heart sounds. Pulmonary:      Effort: Pulmonary effort is normal.      Breath sounds: Normal breath sounds. Musculoskeletal:         General: Normal range of motion. Cervical back: Normal range of motion. Right lower leg: No edema. Left lower leg: No edema. Skin:     General: Skin is warm and dry. Neurological:      Mental Status: He is alert and oriented to person, place, and time. Psychiatric:         Mood and Affect: Mood normal.         Behavior: Behavior normal.         Thought Content: Thought content normal.         Judgment: Judgment normal.          On this date 9/26/2022 I have spent 35 minutes reviewing previous notes, test results and face to face with the patient discussing the diagnosis and importance of compliance with the treatment plan as well as documenting on the day of the visit. An electronic signature was used to authenticate this note.     --Gonzalo Ruiz PA-C

## 2022-10-27 DIAGNOSIS — E11.40 TYPE 2 DIABETES MELLITUS WITH DIABETIC NEUROPATHY, WITHOUT LONG-TERM CURRENT USE OF INSULIN (HCC): ICD-10-CM

## 2022-10-27 LAB
BASOPHILS # BLD: 0 K/UL (ref 0–0.2)
BASOPHILS NFR BLD: 0 % (ref 0–2)
DIFFERENTIAL METHOD BLD: NORMAL
EOSINOPHIL # BLD: 0.1 K/UL (ref 0–0.8)
EOSINOPHIL NFR BLD: 2 % (ref 0.5–7.8)
ERYTHROCYTE [DISTWIDTH] IN BLOOD BY AUTOMATED COUNT: 12.6 % (ref 11.9–14.6)
HCT VFR BLD AUTO: 42.6 % (ref 41.1–50.3)
HGB BLD-MCNC: 13.7 G/DL (ref 13.6–17.2)
IMM GRANULOCYTES # BLD AUTO: 0 K/UL (ref 0–0.5)
IMM GRANULOCYTES NFR BLD AUTO: 0 % (ref 0–5)
LYMPHOCYTES # BLD: 2.3 K/UL (ref 0.5–4.6)
LYMPHOCYTES NFR BLD: 30 % (ref 13–44)
MCH RBC QN AUTO: 31.6 PG (ref 26.1–32.9)
MCHC RBC AUTO-ENTMCNC: 32.2 G/DL (ref 31.4–35)
MCV RBC AUTO: 98.2 FL (ref 82–102)
MONOCYTES # BLD: 0.6 K/UL (ref 0.1–1.3)
MONOCYTES NFR BLD: 8 % (ref 4–12)
NEUTS SEG # BLD: 4.5 K/UL (ref 1.7–8.2)
NEUTS SEG NFR BLD: 60 % (ref 43–78)
NRBC # BLD: 0 K/UL (ref 0–0.2)
PLATELET # BLD AUTO: 218 K/UL (ref 150–450)
PMV BLD AUTO: 10 FL (ref 9.4–12.3)
RBC # BLD AUTO: 4.34 M/UL (ref 4.23–5.6)
WBC # BLD AUTO: 7.5 K/UL (ref 4.3–11.1)

## 2022-10-28 LAB
ALBUMIN SERPL-MCNC: 3.9 G/DL (ref 3.2–4.6)
ALBUMIN/GLOB SERPL: 1.3 {RATIO} (ref 0.4–1.6)
ALP SERPL-CCNC: 61 U/L (ref 50–136)
ALT SERPL-CCNC: 49 U/L (ref 12–65)
ANION GAP SERPL CALC-SCNC: 3 MMOL/L (ref 2–11)
AST SERPL-CCNC: 25 U/L (ref 15–37)
BILIRUB SERPL-MCNC: 0.3 MG/DL (ref 0.2–1.1)
BUN SERPL-MCNC: 25 MG/DL (ref 8–23)
CALCIUM SERPL-MCNC: 9.7 MG/DL (ref 8.3–10.4)
CHLORIDE SERPL-SCNC: 111 MMOL/L (ref 101–110)
CHOLEST SERPL-MCNC: 91 MG/DL
CO2 SERPL-SCNC: 25 MMOL/L (ref 21–32)
CREAT SERPL-MCNC: 1.3 MG/DL (ref 0.8–1.5)
GLOBULIN SER CALC-MCNC: 3.1 G/DL (ref 2.8–4.5)
GLUCOSE SERPL-MCNC: 283 MG/DL (ref 65–100)
HDLC SERPL-MCNC: 32 MG/DL (ref 40–60)
HDLC SERPL: 2.8 {RATIO}
LDLC SERPL CALC-MCNC: 25 MG/DL
POTASSIUM SERPL-SCNC: 4.2 MMOL/L (ref 3.5–5.1)
PROT SERPL-MCNC: 7 G/DL (ref 6.3–8.2)
SODIUM SERPL-SCNC: 139 MMOL/L (ref 133–143)
TRIGL SERPL-MCNC: 170 MG/DL (ref 35–150)
VLDLC SERPL CALC-MCNC: 34 MG/DL (ref 6–23)

## 2022-10-29 LAB
EST. AVERAGE GLUCOSE BLD GHB EST-MCNC: 192 MG/DL
HBA1C MFR BLD: 8.3 % (ref 4.8–5.6)

## 2022-10-31 ENCOUNTER — OFFICE VISIT (OUTPATIENT)
Dept: INTERNAL MEDICINE CLINIC | Facility: CLINIC | Age: 77
End: 2022-10-31
Payer: COMMERCIAL

## 2022-10-31 VITALS
HEIGHT: 69 IN | HEART RATE: 91 BPM | BODY MASS INDEX: 34.66 KG/M2 | OXYGEN SATURATION: 100 % | DIASTOLIC BLOOD PRESSURE: 78 MMHG | TEMPERATURE: 97.6 F | SYSTOLIC BLOOD PRESSURE: 128 MMHG | WEIGHT: 234 LBS

## 2022-10-31 DIAGNOSIS — E66.01 CLASS 2 SEVERE OBESITY DUE TO EXCESS CALORIES WITH SERIOUS COMORBIDITY AND BODY MASS INDEX (BMI) OF 35.0 TO 35.9 IN ADULT (HCC): ICD-10-CM

## 2022-10-31 DIAGNOSIS — N28.9 RENAL INSUFFICIENCY: ICD-10-CM

## 2022-10-31 DIAGNOSIS — E78.5 HYPERLIPIDEMIA LDL GOAL <100: ICD-10-CM

## 2022-10-31 DIAGNOSIS — E11.40 TYPE 2 DIABETES MELLITUS WITH DIABETIC NEUROPATHY, WITHOUT LONG-TERM CURRENT USE OF INSULIN (HCC): Primary | ICD-10-CM

## 2022-10-31 DIAGNOSIS — I10 ESSENTIAL (PRIMARY) HYPERTENSION: ICD-10-CM

## 2022-10-31 PROCEDURE — 1123F ACP DISCUSS/DSCN MKR DOCD: CPT | Performed by: PHYSICIAN ASSISTANT

## 2022-10-31 PROCEDURE — 3078F DIAST BP <80 MM HG: CPT | Performed by: PHYSICIAN ASSISTANT

## 2022-10-31 PROCEDURE — 3074F SYST BP LT 130 MM HG: CPT | Performed by: PHYSICIAN ASSISTANT

## 2022-10-31 PROCEDURE — 3052F HG A1C>EQUAL 8.0%<EQUAL 9.0%: CPT | Performed by: PHYSICIAN ASSISTANT

## 2022-10-31 PROCEDURE — 99214 OFFICE O/P EST MOD 30 MIN: CPT | Performed by: PHYSICIAN ASSISTANT

## 2022-10-31 RX ORDER — RAMIPRIL 10 MG/1
CAPSULE ORAL
Qty: 90 CAPSULE | Refills: 5 | OUTPATIENT
Start: 2022-10-31

## 2022-10-31 ASSESSMENT — PATIENT HEALTH QUESTIONNAIRE - PHQ9
SUM OF ALL RESPONSES TO PHQ QUESTIONS 1-9: 0
1. LITTLE INTEREST OR PLEASURE IN DOING THINGS: 0
2. FEELING DOWN, DEPRESSED OR HOPELESS: 0
SUM OF ALL RESPONSES TO PHQ9 QUESTIONS 1 & 2: 0

## 2022-10-31 ASSESSMENT — ENCOUNTER SYMPTOMS: SHORTNESS OF BREATH: 0

## 2022-10-31 NOTE — PROGRESS NOTES
Marine Cantor (:  [id number]) is a 78 y.o. male,Established patient, here for evaluation of the following chief complaint(s):  Follow-up (Diabetes, Hypertension, Hyperlipidemia)         ASSESSMENT/PLAN:  1. Type 2 diabetes mellitus with diabetic neuropathy, without long-term current use of insulin (HCC)  -     Comprehensive Metabolic Panel; Future  -     Hemoglobin A1C; Future  2. Hyperlipidemia LDL goal <100  -     Comprehensive Metabolic Panel; Future  -     Lipid Panel; Future  3. Essential (primary) hypertension  -     Comprehensive Metabolic Panel; Future  4. Class 2 severe obesity due to excess calories with serious comorbidity and body mass index (BMI) of 35.0 to 35.9 in adult ([facility]  5. Renal insufficiency  -     Comprehensive Metabolic Panel; Future      Patient Instructions   Discontinue Ozempic since GLP-1 therapy has not seemed to be therapeutically beneficial for him  Reiterated the importance of introducing some form of healthy carbohydrate (light greek yogurt or oatmeal) in the mornings after prolonged fast to halt the production of glucagon from his liver  Trial on Jardiance 10 mg daily - samples given x 6 weeks  Counseled on the importance of staying well hydrated with plenty of water - recommend 2 quarts/day  Monitor blood sugar daily and keep record to bring to next appointment  Continue chronic medications as prescribed  Monitor blood pressure 2 times/week and keep record to bring to next appointment      Return in about 6 weeks (around 2022) for diabetes f/u. Subjective   SUBJECTIVE/OBJECTIVE:  The patient is a 78 y.o. male who is seen for follow up of diabetes. Current monitoring regimen: BGs are high in the morning.   Glucose monitoring frequency: 1-2 times daily  Home blood sugar records: fasting range: 190-240, pre-dinner range: 130-170  Any episodes of hypoglycemia? no  Known diabetic complications: peripheral neuropathy  Current diabetic medications include: oral agents (triple therapy): Glucophage  mg bid (was advised to reduce to 500 mg once daily with largest meal but only did that for ~ 10 days then adjusted back to 250 mg (1/2 of 500 mg tablet) q AM + 500 mg q PM, repaglinide (Prandin) 1 mg (2 x 0.5 mg tablets) bid with meals and Ozempic 0.5 mg weekly (took x 3 weeks before pen ran out). He described frequent liquid stools in the evening after taking 2nd dose of Metformin which is why we advised the modified schedule but he states it wasn't that bothersome. He describes 2 days of bloating + excess gas + belching while on Ozempic with no recurrence of symptoms since discontinuing Ozempic  Previous treatments tried: plain Metformin caused diarrhea, Actos in 2017. Current Eye Exam (within one year): Yes - Aug 2022  Current Urine Microalbumin: No, normal - Mar 2021  Is She on ACE inhibitor or angiotensin II receptor blocker? Yes - ramipril (Altace)  Current Foot Exam (within one year): Yes - Jan 2022      Weight trend: fluctuating a bit  Prior visit with dietician: no  Current diet: meals per day on average: 2; restricting intake of the following: carbohydrates  Current exercise: walking        Last HbA1C:    Lab Results   Component Value Date    LABA1C 8.3 (H) 10/27/2022     Lab Results   Component Value Date     10/27/2022       The patient is a 78 y.o. male who is seen for evaluation of hyperlipidemia. He was tested because of hyperlipidemia w/ LDL goal < 100 + diabetes. The current state of this condition is needs to follow diet more regularly, no significant medication side effects noted, and reasonably well controlled (triglycerides increased & LDL controlled) on Lipitor 40 mg q hs - taking as prescribed.        Last Lipid Panel:   Lab Results   Component Value Date    CHOL 91 10/27/2022    CHOL 99 07/21/2022    CHOL 108 01/03/2022     Lab Results   Component Value Date    TRIG 170 (H) 10/27/2022    TRIG 147 07/21/2022    TRIG 126 01/03/2022 Lab Results   Component Value Date    HDL 32 (L) 10/27/2022    HDL 33 (L) 07/21/2022    HDL 35 (L) 01/03/2022     Lab Results   Component Value Date    LDLCALC 25 10/27/2022    LDLCALC 36.6 07/21/2022    LDLCALC 50 01/03/2022     Lab Results   Component Value Date    LABVLDL 34 (H) 10/27/2022    LABVLDL 29.4 (H) 07/21/2022    LABVLDL 21 05/22/2020    VLDL 23 01/03/2022    VLDL 20 03/05/2021     Lab Results   Component Value Date    CHOLHDLRATIO 2.8 10/27/2022    CHOLHDLRATIO 3.0 07/21/2022       The patient is a 78 y.o. male who is seen for follow-up of hypertension. He is exercising and is adherent to low salt diet. Daily caffiene intake: an unknown amount. Current medication regimen consists of: Ramipril 10 mg daily +/- additional 5 mg prn for elevated blood pressure. Blood pressure is borderline controlled at home, ranging 140's/80's. BP Readings from Last 3 Encounters:   10/31/22 128/78   09/26/22 132/80   08/30/22 (!) 150/88         Review of Systems   Constitutional:  Negative for fatigue and unexpected weight change. Eyes:  Negative for visual disturbance. Respiratory:  Negative for shortness of breath. Cardiovascular:  Negative for chest pain, palpitations and leg swelling. Endocrine: Negative for polydipsia and polyuria. Musculoskeletal:  Negative for myalgias. Neurological:  Negative for dizziness, numbness and headaches. BP (!) 140/80 (Site: Left Upper Arm, Position: Sitting, Cuff Size: Large Adult)   Pulse 91   Temp 97.6 °F (36.4 °C) (Temporal)   Ht 5' 8.5\" (1.74 m)   Wt 234 lb (106.1 kg)   SpO2 100%   BMI 35.06 kg/m²       /78   Pulse 91   Temp 97.6 °F (36.4 °C) (Temporal)   Ht 5' 8.5\" (1.74 m)   Wt 234 lb (106.1 kg)   SpO2 100%   BMI 35.06 kg/m²       Objective   Physical Exam  Constitutional:       Appearance: Normal appearance. He is obese. HENT:      Head: Normocephalic and atraumatic.    Eyes:      Conjunctiva/sclera: Conjunctivae normal. Pupils: Pupils are equal, round, and reactive to light. Neck:      Vascular: No carotid bruit. Cardiovascular:      Rate and Rhythm: Normal rate and regular rhythm. Heart sounds: Normal heart sounds. Pulmonary:      Effort: Pulmonary effort is normal.      Breath sounds: Normal breath sounds. Musculoskeletal:         General: Normal range of motion. Cervical back: Normal range of motion. Skin:     General: Skin is warm and dry. Neurological:      Mental Status: He is alert and oriented to person, place, and time. Psychiatric:         Mood and Affect: Mood normal.         Behavior: Behavior normal.         Thought Content: Thought content normal.         Judgment: Judgment normal.          On this date 10/31/2022 I have spent 35 minutes reviewing previous notes, test results and face to face with the patient discussing the diagnosis and importance of compliance with the treatment plan as well as documenting on the day of the visit. An electronic signature was used to authenticate this note.     --Jimmy Alegria PA-C

## 2022-11-03 ENCOUNTER — PATIENT MESSAGE (OUTPATIENT)
Dept: INTERNAL MEDICINE CLINIC | Facility: CLINIC | Age: 77
End: 2022-11-03

## 2022-11-03 DIAGNOSIS — I10 ESSENTIAL HYPERTENSION: Primary | ICD-10-CM

## 2022-11-04 NOTE — TELEPHONE ENCOUNTER
From: Meagan Mayen  To: Dr. Yasmin Saxena: 11/3/2022 4:10 PM EDT  Subject: Ramipril refill    I need Ramipril 10 mg to be refilled. The pharmacy said I had to contact you to get it refilled. Thank you!

## 2022-11-07 RX ORDER — RAMIPRIL 10 MG/1
10 CAPSULE ORAL DAILY
Qty: 90 CAPSULE | Refills: 3 | Status: SHIPPED | OUTPATIENT
Start: 2022-11-07

## 2022-12-12 ENCOUNTER — OFFICE VISIT (OUTPATIENT)
Dept: INTERNAL MEDICINE CLINIC | Facility: CLINIC | Age: 77
End: 2022-12-12
Payer: MEDICARE

## 2022-12-12 VITALS
HEIGHT: 69 IN | HEART RATE: 74 BPM | WEIGHT: 228 LBS | TEMPERATURE: 98.1 F | DIASTOLIC BLOOD PRESSURE: 72 MMHG | OXYGEN SATURATION: 97 % | SYSTOLIC BLOOD PRESSURE: 136 MMHG | BODY MASS INDEX: 33.77 KG/M2

## 2022-12-12 DIAGNOSIS — E11.65 UNCONTROLLED TYPE 2 DIABETES MELLITUS WITH HYPERGLYCEMIA (HCC): Primary | ICD-10-CM

## 2022-12-12 DIAGNOSIS — I10 ESSENTIAL HYPERTENSION: ICD-10-CM

## 2022-12-12 DIAGNOSIS — Z23 NEED FOR PNEUMOCOCCAL VACCINE: ICD-10-CM

## 2022-12-12 DIAGNOSIS — E78.5 HYPERLIPIDEMIA LDL GOAL <100: ICD-10-CM

## 2022-12-12 PROCEDURE — 99214 OFFICE O/P EST MOD 30 MIN: CPT | Performed by: PHYSICIAN ASSISTANT

## 2022-12-12 PROCEDURE — G0009 ADMIN PNEUMOCOCCAL VACCINE: HCPCS | Performed by: PHYSICIAN ASSISTANT

## 2022-12-12 PROCEDURE — 3052F HG A1C>EQUAL 8.0%<EQUAL 9.0%: CPT | Performed by: PHYSICIAN ASSISTANT

## 2022-12-12 PROCEDURE — 3078F DIAST BP <80 MM HG: CPT | Performed by: PHYSICIAN ASSISTANT

## 2022-12-12 PROCEDURE — 90677 PCV20 VACCINE IM: CPT | Performed by: PHYSICIAN ASSISTANT

## 2022-12-12 PROCEDURE — 1123F ACP DISCUSS/DSCN MKR DOCD: CPT | Performed by: PHYSICIAN ASSISTANT

## 2022-12-12 PROCEDURE — 3074F SYST BP LT 130 MM HG: CPT | Performed by: PHYSICIAN ASSISTANT

## 2022-12-12 RX ORDER — REPAGLINIDE 0.5 MG/1
TABLET ORAL
Qty: 360 TABLET | Refills: 3 | Status: SHIPPED | OUTPATIENT
Start: 2022-12-12

## 2022-12-12 RX ORDER — RAMIPRIL 5 MG/1
5 CAPSULE ORAL EVERY EVENING
COMMUNITY

## 2022-12-12 RX ORDER — ATORVASTATIN CALCIUM 40 MG/1
40 TABLET, FILM COATED ORAL NIGHTLY
Qty: 90 TABLET | Refills: 3 | Status: SHIPPED | OUTPATIENT
Start: 2022-12-12

## 2022-12-12 RX ORDER — METFORMIN HYDROCHLORIDE 500 MG/1
500 TABLET, EXTENDED RELEASE ORAL 2 TIMES DAILY WITH MEALS
Qty: 60 TABLET | Refills: 2 | Status: CANCELLED | OUTPATIENT
Start: 2022-12-12

## 2022-12-12 ASSESSMENT — PATIENT HEALTH QUESTIONNAIRE - PHQ9
2. FEELING DOWN, DEPRESSED OR HOPELESS: 0
1. LITTLE INTEREST OR PLEASURE IN DOING THINGS: 0
SUM OF ALL RESPONSES TO PHQ QUESTIONS 1-9: 0
SUM OF ALL RESPONSES TO PHQ9 QUESTIONS 1 & 2: 0
SUM OF ALL RESPONSES TO PHQ QUESTIONS 1-9: 0

## 2022-12-12 ASSESSMENT — ENCOUNTER SYMPTOMS: SHORTNESS OF BREATH: 0

## 2022-12-12 NOTE — PATIENT INSTRUCTIONS
Trial increase of Jardiance to 25 mg daily - samples given  Cautioned to notify me if urinary frequency becomes a problem for his quality of life  Discussed likely need for single dose basal insulin to combat the high AM glucose values  Monitor blood sugar 2-3 times/day and keep record to bring to next appointment  Continue chronic medications as prescribed  Monitor blood pressure 1-2 times/week and keep record to bring to next appointment  Reminded of the necessity of staying well hydrated with plenty of water as long as he remains on Jardiance or a comparable product    pneumococcal 20-valent conjugate vaccine  Pronunciation: YAMILA LUNA al 20-VAY marcellat REESE aguero VAX een  Brand: Prevnar 20  What is the most important information I should know about pneumococcal 20-valent conjugate vaccine? You should not receive this vaccine if you ever had a severe allergic reactionto a pneumococcal or diphtheria toxoid vaccine. What is pneumococcal 20-valent conjugate vaccine? Pneumococcal disease is a serious infection caused by a bacteria that can infect the sinuses, inner ear, lungs, blood, and brain. These conditions can befatal.  Pneumococcal 20-valent conjugate vaccine is used in adults to help prevent disease caused by pneumococcal bacteria. This vaccine contains 20 differenttypes of pneumococcal bacteria. This vaccine helps your body develop immunity to the disease, but will nottreat an active infection you already have. Like any vaccine, pneumococcal 20-valent conjugate vaccine may not provideprotection from disease in every person. What should I discuss with my healthcare provider before taking pneumococcal 20-valent conjugate vaccine? You should not receive this vaccine if you ever had a severe allergic reactionto a pneumococcal or diphtheria toxoid vaccine.   Tell the vaccination provider if you have:  a weak immune system (caused by disease or by using certain medicine); or  if you are receiving radiation or chemotherapy. You can still receive a vaccine if you have a minor cold. In the case of a more severe illness with a fever or any type of infection, wait until you get betterbefore receiving this vaccine. Tell the vaccination provider if you are pregnant or breastfeeding. How should I take pneumococcal 20-valent conjugate vaccine? This vaccine is given as an injection (shot) into a muscle. Pneumococcal 20-valent conjugate vaccine is usually given as 1 shot. What happens if I miss a dose? Pneumococcal 20-valent conjugate vaccine is used as a single dose and does nothave a booster schedule. What happens if I overdose? An overdose of this vaccine is unlikely to occur. What should I avoid while taking pneumococcal 20-valent conjugate vaccine? Follow your doctor's instructions about any restrictions on food, beverages, oractivity. What are the possible side effects of pneumococcal 20-valent conjugate vaccine? Get emergency medical help if you have signs of an allergic reaction: hives; difficult breathing; swelling of your face, lips, tongue, or throat. Keep track of all side effects you have. If you ever need another pneumococcal 20-valent conjugate vaccine, you will need to tell the vaccination provider ifthe previous shot caused any side effects. Becoming infected with pneumococcal disease is much more dangerous to your health than receiving this vaccine. However, like any medicine, this vaccinecan cause side effects but the risk of serious side effects is low. Common side effects may include:  pain or swelling where a shot was given;  muscle or joint pain;  headache; or  feeling tired. This is not a complete list of side effects and others may occur. Call your doctor for medical advice about side effects. You may report vaccine sideeffects to the [address] [place] [address] [place] at [phone]. What other drugs will affect pneumococcal 20-valent conjugate vaccine?   Tell the vaccination provider if you have recently received drugs or treatmentsthat can weaken the immune system, including:  steroid medicine;  medications to treat psoriasis, rheumatoid arthritis, or other autoimmune disorders; or  medicines to treat or prevent organ transplant rejection. This list is not complete. Other drugs may affect pneumococcal 20-valent conjugate vaccine, including prescription and over-the-counter medicines, vitamins, and herbal products. Not all possible drug interactions are listedhere. Where can I get more information? Your vaccination provider, pharmacist, or doctor can provide more information about this vaccine. Additional information is available from your local [facility] or the Centers for Disease Control and Prevention. Remember, keep this and all other medicines out of the reach of children, never share your medicines with others, and use this medication only for the indication prescribed. Every effort has been made to ensure that the information provided by [address] is accurate, up-to-date, and complete, but no guarantee is made to that effect. Drug information contained herein may be time sensitive. [facility] information has been compiled for use by healthcare practitioners and consumers in the [place] and therefore [employer] does not warrant that uses outside of the [place] are appropriate, unless specifically indicated otherwise. [facility]'s drug information does not endorse drugs, diagnose patients or recommend therapy. [facility] drug information is an informational resource designed to assist licensed healthcare practitioners in caring for their patients and/or to serve consumers viewing this service as a supplement to, and not a substitute for, the expertise, skill, knowledge and judgment of healthcare practitioners.  The absence of a warning for a given drug or drug combination in no way should be construed to indicate that the drug or drug combination is safe, effective or appropriate for any given patient. [facility] does not assume any responsibility for any aspect of healthcare administered with the aid of information [facility] provides. The information contained herein is not intended to cover all possible uses, directions, precautions, warnings, drug interactions, allergic reactions, or adverse effects. If you have questions about the drugs you are taking, check with yourdoctor, nurse or pharmacist.  Copyright 2447-9596 [facility] [place]. Version: 1.02. Revision date: 3/4/2022. Care instructions adapted under license by [facility] ([facility]). If you have questions about a medical condition or this instruction, always ask your healthcare professional. [address] any warranty or liability for your use of this information.

## 2022-12-12 NOTE — PROGRESS NOTES
Amara Figueroa (:  [id number]) is a 78 y.o. male,Established patient, here for evaluation of the following chief complaint(s):  Follow-up (Diabetes)         ASSESSMENT/PLAN:  1. Uncontrolled type 2 diabetes mellitus with hyperglycemia (HCC)  -     repaglinide (PRANDIN) 0.5 MG tablet; Take 1-2 tablets po bid with meals, Disp-360 tablet, R-3Normal  2. Hyperlipidemia LDL goal <100  -     atorvastatin (LIPITOR) 40 MG tablet; Take 1 tablet by mouth nightly, Disp-90 tablet, R-3Normal  3. Need for pneumococcal vaccine  -     Pneumococcal, PCV20, PREVNAR 20, (age 25 yrs+), IM, PF  4. Essential hypertension      Patient Instructions   Trial increase of Jardiance to 25 mg daily - samples given  Cautioned to notify me if urinary frequency becomes a problem for his quality of life  Discussed likely need for single dose basal insulin to combat the high AM glucose values  Monitor blood sugar 2-3 times/day and keep record to bring to next appointment  Continue chronic medications as prescribed  Monitor blood pressure 1-2 times/week and keep record to bring to next appointment  Reminded of the necessity of staying well hydrated with plenty of water as long as he remains on Jardiance or a comparable product    pneumococcal 20-valent conjugate vaccine  Pronunciation: YAMILA jacobs 20-VAY lynda foreman  Brand: Prevnar 20  What is the most important information I should know about pneumococcal 20-valent conjugate vaccine? You should not receive this vaccine if you ever had a severe allergic reactionto a pneumococcal or diphtheria toxoid vaccine. What is pneumococcal 20-valent conjugate vaccine? Pneumococcal disease is a serious infection caused by a bacteria that can infect the sinuses, inner ear, lungs, blood, and brain. These conditions can befatal.  Pneumococcal 20-valent conjugate vaccine is used in adults to help prevent disease caused by pneumococcal bacteria.  This vaccine contains 20 differenttypes of pneumococcal bacteria. This vaccine helps your body develop immunity to the disease, but will nottreat an active infection you already have. Like any vaccine, pneumococcal 20-valent conjugate vaccine may not provideprotection from disease in every person. What should I discuss with my healthcare provider before taking pneumococcal 20-valent conjugate vaccine? You should not receive this vaccine if you ever had a severe allergic reactionto a pneumococcal or diphtheria toxoid vaccine. Tell the vaccination provider if you have:  a weak immune system (caused by disease or by using certain medicine); or  if you are receiving radiation or chemotherapy. You can still receive a vaccine if you have a minor cold. In the case of a more severe illness with a fever or any type of infection, wait until you get betterbefore receiving this vaccine. Tell the vaccination provider if you are pregnant or breastfeeding. How should I take pneumococcal 20-valent conjugate vaccine? This vaccine is given as an injection (shot) into a muscle. Pneumococcal 20-valent conjugate vaccine is usually given as 1 shot. What happens if I miss a dose? Pneumococcal 20-valent conjugate vaccine is used as a single dose and does nothave a booster schedule. What happens if I overdose? An overdose of this vaccine is unlikely to occur. What should I avoid while taking pneumococcal 20-valent conjugate vaccine? Follow your doctor's instructions about any restrictions on food, beverages, oractivity. What are the possible side effects of pneumococcal 20-valent conjugate vaccine? Get emergency medical help if you have signs of an allergic reaction: hives; difficult breathing; swelling of your face, lips, tongue, or throat. Keep track of all side effects you have. If you ever need another pneumococcal 20-valent conjugate vaccine, you will need to tell the vaccination provider ifthe previous shot caused any side effects.   Becoming infected with pneumococcal disease is much more dangerous to your health than receiving this vaccine. However, like any medicine, this vaccinecan cause side effects but the risk of serious side effects is low. Common side effects may include:  pain or swelling where a shot was given;  muscle or joint pain;  headache; or  feeling tired. This is not a complete list of side effects and others may occur. Call your doctor for medical advice about side effects. You may report vaccine sideeffects to the [address] [place] [address] [place] at [phone]. What other drugs will affect pneumococcal 20-valent conjugate vaccine? Tell the vaccination provider if you have recently received drugs or treatmentsthat can weaken the immune system, including:  steroid medicine;  medications to treat psoriasis, rheumatoid arthritis, or other autoimmune disorders; or  medicines to treat or prevent organ transplant rejection. This list is not complete. Other drugs may affect pneumococcal 20-valent conjugate vaccine, including prescription and over-the-counter medicines, vitamins, and herbal products. Not all possible drug interactions are listedhere. Where can I get more information? Your vaccination provider, pharmacist, or doctor can provide more information about this vaccine. Additional information is available from your local [facility] or the Centers for Disease Control and Prevention. Remember, keep this and all other medicines out of the reach of children, never share your medicines with others, and use this medication only for the indication prescribed. Every effort has been made to ensure that the information provided by [facility] [address] is accurate, up-to-date, and complete, but no guarantee is made to that effect. Drug information contained herein may be time sensitive.  [facility] information has been compiled for use by healthcare practitioners and consumers in the [place] and therefore [facility] does not warrant that uses outside of the [place] are appropriate, unless specifically indicated otherwise. [facility] drug information does not endorse drugs, diagnose patients or recommend therapy. [facility]'s drug information is an informational resource designed to assist licensed healthcare practitioners in caring for their patients and/or to serve consumers viewing this service as a supplement to, and not a substitute for, the expertise, skill, knowledge and judgment of healthcare practitioners. The absence of a warning for a given drug or drug combination in no way should be construed to indicate that the drug or drug combination is safe, effective or appropriate for any given patient. [facility] does not assume any responsibility for any aspect of healthcare administered with the aid of information [facility] provides. The information contained herein is not intended to cover all possible uses, directions, precautions, warnings, drug interactions, allergic reactions, or adverse effects. If you have questions about the drugs you are taking, check with yourdoctor, nurse or pharmacist.  Copyright 5471-9749 [address] [place] [address] Dr CLINTON. Version: 1.02. Revision date: 3/4/2022. Care instructions adapted under license by [facility] ([facility]). If you have questions about a medical condition or this instruction, always ask your healthcare professional. [place] [address] any warranty or liability for your use of this information. Return in about 8 weeks (around 2/6/2023) for diabetes f/u. Subjective   SUBJECTIVE/OBJECTIVE:  The patient is a 78 y.o. male who is seen for follow up of diabetes. Current monitoring regimen: BGs are high in the morning.   Glucose monitoring frequency: 2 times daily  Home blood sugar records: fasting range: 170-260, 120-160  Any episodes of hypoglycemia? no  Known diabetic complications: peripheral neuropathy  Current diabetic medications include: oral agents (triple therapy): Glucophage  mg bid, repaglinide (Prandin) 0.5 mg bid with meals, Jardiance 10 mg daily. Lifestyle efforts: tried eating 1/3 of a greek yogurt upon awakening but didn't see any improvements in his AM glucose readings so quit after 9-10 days    Last HbA1C:    Lab Results   Component Value Date    LABA1C 8.3 (H) 10/27/2022     Lab Results   Component Value Date     10/27/2022       The patient is a 78 y.o. male who is seen for evaluation of hyperlipidemia. He was tested because of hyperlipidemia w/ LDL goal < 100 + diabetes. The current state of this condition is control uncertain and no significant medication side effects noted on Lipitor 40 mg q hs - taking as prescribed. Last Lipid Panel:   Lab Results   Component Value Date    CHOL 91 10/27/2022    CHOL 99 07/21/2022    CHOL 108 01/03/2022     Lab Results   Component Value Date    TRIG 170 (H) 10/27/2022    TRIG 147 07/21/2022    TRIG 126 01/03/2022     Lab Results   Component Value Date    HDL 32 (L) 10/27/2022    HDL 33 (L) 07/21/2022    HDL 35 (L) 01/03/2022     Lab Results   Component Value Date    LDLCALC 25 10/27/2022    LDLCALC 36.6 07/21/2022    LDLCALC 50 01/03/2022     Lab Results   Component Value Date    LABVLDL 34 (H) 10/27/2022    LABVLDL 29.4 (H) 07/21/2022    LABVLDL 21 05/22/2020    VLDL 23 01/03/2022    VLDL 20 03/05/2021     Lab Results   Component Value Date    CHOLHDLRATIO 2.8 10/27/2022    CHOLHDLRATIO 3.0 07/21/2022        The patient is a 78 y.o. male who is seen for follow-up of hypertension. He is exercising and is not adherent to low salt diet. Daily caffiene intake: a known amount (3 servings/day). Current medication regimen consists of: Ramipril 10 mg q AM + 5 mg q evening. Blood pressure is not measured at home. BP Readings from Last 3 Encounters:   12/12/22 (!) 150/70   10/31/22 128/78   09/26/22 132/80         Review of Systems   Constitutional:  Negative for fatigue and unexpected weight change.    Eyes: Negative for visual disturbance. Respiratory:  Negative for shortness of breath. Cardiovascular:  Negative for chest pain, palpitations and leg swelling. Endocrine: Positive for polyuria. Negative for polydipsia. Musculoskeletal:  Negative for myalgias. Neurological:  Negative for dizziness, numbness and headaches. BP (!) 150/70 (Site: Left Upper Arm, Position: Sitting, Cuff Size: Large Adult)   Pulse 74   Temp 98.1 °F (36.7 °C) (Temporal)   Ht 5' 8.5\" (1.74 m)   Wt 228 lb (103.4 kg)   SpO2 97%   BMI 34.16 kg/m²       /72   Pulse 74   Temp 98.1 °F (36.7 °C) (Temporal)   Ht 5' 8.5\" (1.74 m)   Wt 228 lb (103.4 kg)   SpO2 97%   BMI 34.16 kg/m²       Objective   Physical Exam  Constitutional:       Appearance: Normal appearance. HENT:      Head: Normocephalic and atraumatic. Eyes:      Conjunctiva/sclera: Conjunctivae normal.      Pupils: Pupils are equal, round, and reactive to light. Neck:      Vascular: No carotid bruit. Cardiovascular:      Rate and Rhythm: Normal rate and regular rhythm. Heart sounds: Normal heart sounds. Pulmonary:      Effort: Pulmonary effort is normal.      Breath sounds: Normal breath sounds. Musculoskeletal:         General: Normal range of motion. Cervical back: Normal range of motion. Right lower leg: Edema (trace) present. Left lower leg: Edema (trace) present. Skin:     General: Skin is warm and dry. Neurological:      Mental Status: He is alert and oriented to person, place, and time. Psychiatric:         Mood and Affect: Mood normal.         Behavior: Behavior normal.         Thought Content: Thought content normal.         Judgment: Judgment normal.          On this date 12/12/2022 I have spent 35 minutes reviewing previous notes, test results and face to face with the patient discussing the diagnosis and importance of compliance with the treatment plan as well as documenting on the day of the visit.       An electronic signature was used to authenticate this note.     --Millicent Puentes PA-C

## 2022-12-14 ENCOUNTER — TELEPHONE (OUTPATIENT)
Dept: INTERNAL MEDICINE CLINIC | Facility: CLINIC | Age: 77
End: 2022-12-14

## 2022-12-14 NOTE — TELEPHONE ENCOUNTER
----- Message from Rocio Shearer PA-C sent at 12/13/2022 10:23 PM EST -----  Regarding: Jardiance samples  Please set aside 4 boxes of Jardiance 25 mg for Hildegarde Kawasaki [birth year] from yesterday's schedule and notify him to  this week.

## 2022-12-15 ENCOUNTER — TELEPHONE (OUTPATIENT)
Dept: INTERNAL MEDICINE CLINIC | Facility: CLINIC | Age: 77
End: 2022-12-15

## 2022-12-15 NOTE — TELEPHONE ENCOUNTER
Tried to process DME order with [employer] for Dip[employer]com G6 CGM. Per [employer]: Documentation not supported. Continuous glucose monitoring is justified for patients who previously received insulin administrations at least 1 time per day, or use a continuous subcutaneous insulin infusion pump.

## 2023-01-09 RX ORDER — METFORMIN HYDROCHLORIDE 500 MG/1
500 TABLET, EXTENDED RELEASE ORAL 2 TIMES DAILY WITH MEALS
Qty: 180 TABLET | Refills: 1 | Status: SHIPPED | OUTPATIENT
Start: 2023-01-09

## 2023-01-10 ENCOUNTER — TELEPHONE (OUTPATIENT)
Dept: INTERNAL MEDICINE CLINIC | Facility: CLINIC | Age: 78
End: 2023-01-10

## 2023-01-17 RX ORDER — RAMIPRIL 5 MG/1
CAPSULE ORAL
Qty: 90 CAPSULE | Refills: 5 | OUTPATIENT
Start: 2023-01-17

## 2023-01-19 ENCOUNTER — TELEPHONE (OUTPATIENT)
Dept: INTERNAL MEDICINE CLINIC | Facility: CLINIC | Age: 78
End: 2023-01-19

## 2023-01-19 DIAGNOSIS — I10 ESSENTIAL HYPERTENSION: ICD-10-CM

## 2023-01-19 NOTE — TELEPHONE ENCOUNTER
Pt called, requests refill of ramipril 5mg mg, requests 90 day supply.     Sent to [employer] on [address] [facility]

## 2023-01-20 RX ORDER — RAMIPRIL 10 MG/1
10 CAPSULE ORAL DAILY
Qty: 90 CAPSULE | Refills: 0 | Status: SHIPPED | OUTPATIENT
Start: 2023-01-20

## 2023-01-20 NOTE — TELEPHONE ENCOUNTER
Pt wife called, \[facility]\"" pharmacy had received refill of 10 mg dose of ramipril. [facility] pt was needing a refill of the 5 mg dosage of ramipril instead.

## 2023-01-23 ENCOUNTER — PATIENT MESSAGE (OUTPATIENT)
Dept: INTERNAL MEDICINE CLINIC | Facility: CLINIC | Age: 78
End: 2023-01-23

## 2023-01-23 DIAGNOSIS — I10 ESSENTIAL HYPERTENSION: Primary | ICD-10-CM

## 2023-01-23 RX ORDER — RAMIPRIL 5 MG/1
CAPSULE ORAL
Qty: 90 CAPSULE | Refills: 5 | Status: SHIPPED | OUTPATIENT
Start: 2023-01-23

## 2023-01-23 NOTE — TELEPHONE ENCOUNTER
From: Perla Tobar  To: Dr. Tesfaye Mcmanus: 1/23/2023 9:11 AM EST  Subject: Refill Ramipril 5 mg    I am having a real hard time getting my prescription for Ramipril 5 mg (NOT THE 10mg). Juli called in a request to your office 2-3 times when Rosalino said I couldnt do it there. I think there may be a mix up since I get two different doses of Ramipril. - 5mg and 10mg. I need the 5mg right now. Please let me know when this is called in to the [facility] pharmacy. Thank you.

## 2023-01-30 DIAGNOSIS — E78.5 HYPERLIPIDEMIA LDL GOAL <100: ICD-10-CM

## 2023-01-30 DIAGNOSIS — N28.9 RENAL INSUFFICIENCY: ICD-10-CM

## 2023-01-30 DIAGNOSIS — I10 ESSENTIAL (PRIMARY) HYPERTENSION: ICD-10-CM

## 2023-01-30 DIAGNOSIS — E11.40 TYPE 2 DIABETES MELLITUS WITH DIABETIC NEUROPATHY, WITHOUT LONG-TERM CURRENT USE OF INSULIN (HCC): ICD-10-CM

## 2023-01-30 LAB
EST. AVERAGE GLUCOSE BLD GHB EST-MCNC: 203 MG/DL
HBA1C MFR BLD: 8.7 % (ref 4.8–5.6)

## 2023-01-31 LAB
ALBUMIN SERPL-MCNC: 3.9 G/DL (ref 3.2–4.6)
ALBUMIN/GLOB SERPL: 1.4 (ref 0.4–1.6)
ALP SERPL-CCNC: 65 U/L (ref 50–136)
ALT SERPL-CCNC: 57 U/L (ref 12–65)
ANION GAP SERPL CALC-SCNC: 11 MMOL/L (ref 2–11)
AST SERPL-CCNC: 25 U/L (ref 15–37)
BILIRUB SERPL-MCNC: 0.4 MG/DL (ref 0.2–1.1)
BUN SERPL-MCNC: 28 MG/DL (ref 8–23)
CALCIUM SERPL-MCNC: 9.1 MG/DL (ref 8.3–10.4)
CHLORIDE SERPL-SCNC: 113 MMOL/L (ref 101–110)
CHOLEST SERPL-MCNC: 87 MG/DL
CO2 SERPL-SCNC: 19 MMOL/L (ref 21–32)
CREAT SERPL-MCNC: 1.2 MG/DL (ref 0.8–1.5)
GLOBULIN SER CALC-MCNC: 2.8 G/DL (ref 2.8–4.5)
GLUCOSE SERPL-MCNC: 242 MG/DL (ref 65–100)
HDLC SERPL-MCNC: 33 MG/DL (ref 40–60)
HDLC SERPL: 2.6
LDLC SERPL CALC-MCNC: 24.8 MG/DL
POTASSIUM SERPL-SCNC: 4.2 MMOL/L (ref 3.5–5.1)
PROT SERPL-MCNC: 6.7 G/DL (ref 6.3–8.2)
SODIUM SERPL-SCNC: 143 MMOL/L (ref 133–143)
TRIGL SERPL-MCNC: 146 MG/DL (ref 35–150)
VLDLC SERPL CALC-MCNC: 29.2 MG/DL (ref 6–23)

## 2023-02-06 ENCOUNTER — OFFICE VISIT (OUTPATIENT)
Dept: INTERNAL MEDICINE CLINIC | Facility: CLINIC | Age: 78
End: 2023-02-06
Payer: COMMERCIAL

## 2023-02-06 VITALS
BODY MASS INDEX: 33.33 KG/M2 | HEIGHT: 69 IN | OXYGEN SATURATION: 100 % | HEART RATE: 66 BPM | DIASTOLIC BLOOD PRESSURE: 70 MMHG | WEIGHT: 225 LBS | TEMPERATURE: 97.2 F | SYSTOLIC BLOOD PRESSURE: 130 MMHG

## 2023-02-06 DIAGNOSIS — I10 ESSENTIAL HYPERTENSION: ICD-10-CM

## 2023-02-06 DIAGNOSIS — E11.65 UNCONTROLLED TYPE 2 DIABETES MELLITUS WITH HYPERGLYCEMIA (HCC): Primary | ICD-10-CM

## 2023-02-06 DIAGNOSIS — Z23 NEED FOR SHINGLES VACCINE: ICD-10-CM

## 2023-02-06 DIAGNOSIS — Z28.21 COVID-19 VACCINE REGIMEN TO MAINTAIN IMMUNITY DECLINED: ICD-10-CM

## 2023-02-06 DIAGNOSIS — E78.5 HYPERLIPIDEMIA LDL GOAL <100: ICD-10-CM

## 2023-02-06 LAB
CREAT UR-MCNC: 101 MG/DL
MICROALBUMIN UR-MCNC: 0.58 MG/DL (ref 0–3)
MICROALBUMIN/CREAT UR-RTO: 6 MG/G (ref 0–30)

## 2023-02-06 PROCEDURE — 1123F ACP DISCUSS/DSCN MKR DOCD: CPT | Performed by: PHYSICIAN ASSISTANT

## 2023-02-06 PROCEDURE — 3052F HG A1C>EQUAL 8.0%<EQUAL 9.0%: CPT | Performed by: PHYSICIAN ASSISTANT

## 2023-02-06 PROCEDURE — 99215 OFFICE O/P EST HI 40 MIN: CPT | Performed by: PHYSICIAN ASSISTANT

## 2023-02-06 PROCEDURE — 3078F DIAST BP <80 MM HG: CPT | Performed by: PHYSICIAN ASSISTANT

## 2023-02-06 PROCEDURE — 3075F SYST BP GE 130 - 139MM HG: CPT | Performed by: PHYSICIAN ASSISTANT

## 2023-02-06 RX ORDER — INSULIN GLARGINE AND LIXISENATIDE 100; 33 U/ML; UG/ML
15 INJECTION, SOLUTION SUBCUTANEOUS EVERY MORNING
Qty: 15 ML | Refills: 0 | Status: SHIPPED | OUTPATIENT
Start: 2023-02-06

## 2023-02-06 RX ORDER — RAMIPRIL 10 MG/1
10 CAPSULE ORAL DAILY
Qty: 90 CAPSULE | Refills: 3 | Status: SHIPPED | OUTPATIENT
Start: 2023-02-06

## 2023-02-06 RX ORDER — REPAGLINIDE 0.5 MG/1
TABLET ORAL
Qty: 360 TABLET | Refills: 3 | Status: SHIPPED | OUTPATIENT
Start: 2023-02-06

## 2023-02-06 RX ORDER — ZOSTER VACCINE RECOMBINANT, ADJUVANTED 50 MCG/0.5
0.5 KIT INTRAMUSCULAR SEE ADMIN INSTRUCTIONS
Qty: 0.5 ML | Refills: 1 | Status: SHIPPED | OUTPATIENT
Start: 2023-02-06 | End: 2023-08-05

## 2023-02-06 RX ORDER — ATORVASTATIN CALCIUM 40 MG/1
40 TABLET, FILM COATED ORAL NIGHTLY
Qty: 90 TABLET | Refills: 3 | Status: SHIPPED | OUTPATIENT
Start: 2023-02-06

## 2023-02-06 SDOH — ECONOMIC STABILITY: INCOME INSECURITY: HOW HARD IS IT FOR YOU TO PAY FOR THE VERY BASICS LIKE FOOD, HOUSING, MEDICAL CARE, AND HEATING?: NOT HARD AT ALL

## 2023-02-06 SDOH — ECONOMIC STABILITY: HOUSING INSECURITY
IN THE LAST 12 MONTHS, WAS THERE A TIME WHEN YOU DID NOT HAVE A STEADY PLACE TO SLEEP OR SLEPT IN A SHELTER (INCLUDING NOW)?: NO

## 2023-02-06 SDOH — ECONOMIC STABILITY: FOOD INSECURITY: WITHIN THE PAST 12 MONTHS, THE FOOD YOU BOUGHT JUST DIDN'T LAST AND YOU DIDN'T HAVE MONEY TO GET MORE.: NEVER TRUE

## 2023-02-06 SDOH — ECONOMIC STABILITY: FOOD INSECURITY: WITHIN THE PAST 12 MONTHS, YOU WORRIED THAT YOUR FOOD WOULD RUN OUT BEFORE YOU GOT MONEY TO BUY MORE.: NEVER TRUE

## 2023-02-06 ASSESSMENT — ENCOUNTER SYMPTOMS
SHORTNESS OF BREATH: 0
DIARRHEA: 1
EYE PAIN: 0

## 2023-02-06 ASSESSMENT — PATIENT HEALTH QUESTIONNAIRE - PHQ9
SUM OF ALL RESPONSES TO PHQ QUESTIONS 1-9: 0
2. FEELING DOWN, DEPRESSED OR HOPELESS: 0
1. LITTLE INTEREST OR PLEASURE IN DOING THINGS: 0
SUM OF ALL RESPONSES TO PHQ9 QUESTIONS 1 & 2: 0
SUM OF ALL RESPONSES TO PHQ QUESTIONS 1-9: 0

## 2023-02-06 NOTE — PROGRESS NOTES
Rocky Stringer (:  ) is a 78 y.o. male,Established patient, here for evaluation of the following chief complaint(s):  Follow-up (Diabetes, Hyperlipidemia, Hypertension)         ASSESSMENT/PLAN:  1. Uncontrolled type 2 diabetes mellitus with hyperglycemia (HCC)  -     repaglinide (PRANDIN) 0.5 MG tablet; Take 1-2 tablets po bid with meals, Disp-360 tablet, R-3Normal  -     Insulin Glargine-Lixisenatide (SOLIQUA) 100-33 UNT-MCG/ML SOPN; Inject 15 Units into the skin every morning, Disp-15 mL, R-0Print  -     Insulin Pen Needle 32G X 6 MM MISC; Disp-100 each, R-3, PrintUse to inject [place] once daily  -     Comprehensive Metabolic Panel; Future  -     Hemoglobin A1C; Future  2. Essential hypertension  -     ramipril (ALTACE) 10 MG capsule; Take 1 capsule by mouth daily, Disp-90 capsule, R-3Normal  -     CBC with Auto Differential; Future  -     Comprehensive Metabolic Panel; Future  3. Hyperlipidemia LDL goal <100  -     atorvastatin (LIPITOR) 40 MG tablet; Take 1 tablet by mouth nightly, Disp-90 tablet, R-3Normal  -     Comprehensive Metabolic Panel; Future  -     Lipid Panel; Future  4. Need for shingles vaccine  -     zoster recombinant adjuvanted vaccine [facility]) 50 MCG/0.5ML SUSR injection; Inject 0.5 mLs into the muscle See Admin Instructions 1 dose now and repeat in 2-6 months, Disp-0.5 mL, R-1Print  5.  COVID-19 vaccine regimen to maintain immunity declined      Patient Instructions   Trial addition of Soliqua 10 units every morning with gradual titration by 2 units every 5 days until fasting glucose is below 150 consistently - voucher given  Remain on Jardiance 25 mg daily for now - samples given  Discussed hopes that we could potentially eliminate Prandin once therapeutic dose of [place] is found which will hopefully alleviate some of his GI complaints  Monitor blood sugar 2 times/day and keep record to bring to next appointment  Continue chronic medications as prescribed  Monitor blood pressure 1-2 times/week and keep record to bring to next appointment  Encouraged to consider shingles vaccine (Shingrix) even if previously vaccinated with Zostavax (original live shingles vaccine). Advised that healthy adults 50 years and older should receive 2 doses of Shingrix -  by 2-6 months. Suggest checking with insurance to obtain coverage information. A prescription will be printed and recommended to be purchased and administered at any local pharmacy      Return in 7 weeks (on 3/27/2023) for diabetes f/u + foot exam.         Subjective   SUBJECTIVE/OBJECTIVE:  The patient is a 78 y.o. male who is seen for follow up of diabetes. Current monitoring regimen: BGs are high in the morning. Glucose monitoring frequency: 2 times daily  Home blood sugar records: fasting range: 200-300, pre-lunch range: 130-165, pre-dinner range: 150-200  Any episodes of hypoglycemia? no  Known diabetic complications: peripheral neuropathy  Current diabetic medications include: oral agents (triple therapy): Glucophage  mg bid, repaglinide (Prandin) 0.5 mg bid with meals, Jardiance 25 mg daily. Previous treatments tried: Januvia (via Janumet) Jan 2021-Aug 22 (insufficient efficacy), Rybelsus Aug -Sept 2022 (ineffective), Ozempic Sept-Oct 2022 (insufficient efficacy)    Current Eye Exam (within one year): Yes - Aug 2022  Current Urine Microalbumin: No, normal - Mar 2021  Lab Results   Component Value Date    LABMICR Comment 01/03/2022   Is She on ACE inhibitor or angiotensin II receptor blocker?   Yes - ramipril (Altace)  Current Foot Exam (within one year): Yes - Jan 2022      Weight trend: decreasing steadily over the past 3 months  Prior visit with dietician: no  Current diet: meals per day on average: 2; restricting intake of the following: carbohydrates ; eliminated alcohol before bed  Current exercise: walking 5-6 days/week x 30 minutes        Last HbA1C:    Lab Results   Component Value Date    LABA1C 8.7 (H) 01/30/2023     Lab Results   Component Value Date     01/30/2023       The patient is a 78 y.o. male who is seen for evaluation of hyperlipidemia. He was tested because of hyperlipidemia w/ LDL goal < 100 + diabetes. The current state of this condition is improved, no significant medication side effects noted, and well controlled on Lipitor 40 mg q hs - taking as prescribed. Last Lipid Panel:   Lab Results   Component Value Date    CHOL 87 01/30/2023    CHOL 91 10/27/2022    CHOL 99 07/21/2022     Lab Results   Component Value Date    TRIG 146 01/30/2023    TRIG 170 (H) 10/27/2022    TRIG 147 07/21/2022     Lab Results   Component Value Date    HDL 33 (L) 01/30/2023    HDL 32 (L) 10/27/2022    HDL 33 (L) 07/21/2022     Lab Results   Component Value Date    LDLCALC 24.8 01/30/2023    LDLCALC 25 10/27/2022    LDLCALC 36.6 07/21/2022     Lab Results   Component Value Date    LABVLDL 29.2 (H) 01/30/2023    LABVLDL 34 (H) 10/27/2022    LABVLDL 29.4 (H) 07/21/2022    VLDL 23 01/03/2022    VLDL 20 03/05/2021     Lab Results   Component Value Date    CHOLHDLRATIO 2.6 01/30/2023    CHOLHDLRATIO 2.8 10/27/2022    CHOLHDLRATIO 3.0 07/21/2022       The patient is a 78 y.o. male who is seen for follow-up of hypertension. He is exercising and is not adherent to low salt diet. Daily caffiene intake: a known amount (3-4 servings/day). Current medication regimen consists of: Ramipril 10 mg daily + 5 mg q evening. Blood pressure is not measured at home. BP Readings from Last 3 Encounters:   02/06/23 130/70   12/12/22 136/72   10/31/22 128/78         Review of Systems   Constitutional:  Positive for unexpected weight change (3 pounds lost since last visit). Negative for fatigue. Eyes:  Negative for pain and visual disturbance. Respiratory:  Negative for shortness of breath. Cardiovascular:  Negative for chest pain, palpitations and leg swelling.    Gastrointestinal:  Positive for diarrhea (with bowel urgency especially after dinner). Endocrine: Negative for polydipsia. Genitourinary:  Positive for frequency (with higher dose of Jardiance). Musculoskeletal:  Negative for myalgias. Neurological:  Negative for dizziness, numbness and headaches. /70 (Site: Left Upper Arm, Position: Sitting, Cuff Size: Large Adult)   Pulse 66   Temp 97.2 °F (36.2 °C) (Temporal)   Ht 5' 8.5\" (1.74 m)   Wt 225 lb (102.1 kg)   SpO2 100%   BMI 33.71 kg/m²       Objective   Physical Exam  Constitutional:       Appearance: Normal appearance. HENT:      Head: Normocephalic and atraumatic. Eyes:      Conjunctiva/sclera: Conjunctivae normal.      Pupils: Pupils are equal, round, and reactive to light. Neck:      Vascular: No carotid bruit. Cardiovascular:      Rate and Rhythm: Normal rate and regular rhythm. Heart sounds: Normal heart sounds. Pulmonary:      Effort: Pulmonary effort is normal.      Breath sounds: Normal breath sounds. Musculoskeletal:         General: Normal range of motion. Cervical back: Normal range of motion. Right lower leg: No edema. Left lower leg: No edema. Skin:     General: Skin is warm and dry. Neurological:      Mental Status: He is alert and oriented to person, place, and time. Psychiatric:         Mood and Affect: Mood normal.         Behavior: Behavior normal.         Thought Content: Thought content normal.         Judgment: Judgment normal.          On this date 2/6/2023 I have spent 40 minutes reviewing previous notes, test results and face to face with the patient discussing the diagnosis and importance of compliance with the treatment plan as well as documenting on the day of the visit. An electronic signature was used to authenticate this note.     --Brayan Gonzalez PA-C

## 2023-03-02 ENCOUNTER — PATIENT MESSAGE (OUTPATIENT)
Dept: INTERNAL MEDICINE CLINIC | Facility: CLINIC | Age: 78
End: 2023-03-02

## 2023-03-02 DIAGNOSIS — E11.65 UNCONTROLLED TYPE 2 DIABETES MELLITUS WITH HYPERGLYCEMIA (HCC): Primary | ICD-10-CM

## 2023-03-03 NOTE — TELEPHONE ENCOUNTER
From: Soni Vincent  To: Jessica Scott  Sent: 3/2/2023 12:43 PM EST  Subject: [place] Monday    I will be running out of my once a day Jardiance pills on Monday. Our appt. is not until the end of March. What should I do. P.S. the shots seem to be helping.

## 2023-03-07 NOTE — TELEPHONE ENCOUNTER
Can you leave him 3 bottles of Jardiance 10 mg up front early tomorrow morning? I've already notified him via [address] Thanks!

## 2023-03-28 ENCOUNTER — OFFICE VISIT (OUTPATIENT)
Dept: INTERNAL MEDICINE CLINIC | Facility: CLINIC | Age: 78
End: 2023-03-28
Payer: COMMERCIAL

## 2023-03-28 VITALS
OXYGEN SATURATION: 100 % | BODY MASS INDEX: 33.03 KG/M2 | HEART RATE: 55 BPM | TEMPERATURE: 97.2 F | HEIGHT: 69 IN | DIASTOLIC BLOOD PRESSURE: 80 MMHG | SYSTOLIC BLOOD PRESSURE: 130 MMHG | WEIGHT: 223 LBS

## 2023-03-28 DIAGNOSIS — E11.65 UNCONTROLLED TYPE 2 DIABETES MELLITUS WITH HYPERGLYCEMIA (HCC): Primary | ICD-10-CM

## 2023-03-28 PROCEDURE — 3075F SYST BP GE 130 - 139MM HG: CPT | Performed by: PHYSICIAN ASSISTANT

## 2023-03-28 PROCEDURE — 99213 OFFICE O/P EST LOW 20 MIN: CPT | Performed by: PHYSICIAN ASSISTANT

## 2023-03-28 PROCEDURE — 3052F HG A1C>EQUAL 8.0%<EQUAL 9.0%: CPT | Performed by: PHYSICIAN ASSISTANT

## 2023-03-28 PROCEDURE — 3079F DIAST BP 80-89 MM HG: CPT | Performed by: PHYSICIAN ASSISTANT

## 2023-03-28 PROCEDURE — 1123F ACP DISCUSS/DSCN MKR DOCD: CPT | Performed by: PHYSICIAN ASSISTANT

## 2023-03-28 SDOH — ECONOMIC STABILITY: HOUSING INSECURITY
IN THE LAST 12 MONTHS, WAS THERE A TIME WHEN YOU DID NOT HAVE A STEADY PLACE TO SLEEP OR SLEPT IN A SHELTER (INCLUDING NOW)?: PATIENT REFUSED

## 2023-03-28 SDOH — ECONOMIC STABILITY: FOOD INSECURITY: WITHIN THE PAST 12 MONTHS, THE FOOD YOU BOUGHT JUST DIDN'T LAST AND YOU DIDN'T HAVE MONEY TO GET MORE.: PATIENT DECLINED

## 2023-03-28 SDOH — ECONOMIC STABILITY: FOOD INSECURITY: WITHIN THE PAST 12 MONTHS, YOU WORRIED THAT YOUR FOOD WOULD RUN OUT BEFORE YOU GOT MONEY TO BUY MORE.: PATIENT DECLINED

## 2023-03-28 SDOH — ECONOMIC STABILITY: INCOME INSECURITY: HOW HARD IS IT FOR YOU TO PAY FOR THE VERY BASICS LIKE FOOD, HOUSING, MEDICAL CARE, AND HEATING?: PATIENT DECLINED

## 2023-03-28 ASSESSMENT — PATIENT HEALTH QUESTIONNAIRE - PHQ9
SUM OF ALL RESPONSES TO PHQ QUESTIONS 1-9: 0
1. LITTLE INTEREST OR PLEASURE IN DOING THINGS: 0
SUM OF ALL RESPONSES TO PHQ9 QUESTIONS 1 & 2: 0
2. FEELING DOWN, DEPRESSED OR HOPELESS: 0

## 2023-03-28 ASSESSMENT — ENCOUNTER SYMPTOMS
DIARRHEA: 1
SHORTNESS OF BREATH: 0
NAUSEA: 0
ABDOMINAL PAIN: 0

## 2023-03-28 NOTE — PATIENT INSTRUCTIONS
Trial discontinuation of Prandin unless eating lunch (a meal he doesn't normally eat)  Increase Soliqua to 28 units every morning  Monitor for hypoglycemia in the evening with increased dose of Soliqua - if this occurs please notify me and I will likely change the time of day it is taken  Monitor blood sugar 3 times/day and keep record to bring to next appointment  Continue chronic medications as prescribed  Reviewed that his kidney & liver function are healthy  Suggest contacting insurance company to find out what cost she should expect for Jardiance and/or if any of the comparable products (Iwona Macias, [address]) are covered any better?   Reassured that max price of Soliqua should be $35/month

## 2023-03-28 NOTE — PROGRESS NOTES
Right lower leg: No edema. Left lower leg: No edema. Feet:    Skin:     General: Skin is warm and dry. Neurological:      Mental Status: He is alert and oriented to person, place, and time. Comments: Visual inspection:  Deformity/amputation: absent  Skin lesions/pre-ulcerative calluses: present - plantar surface of both feet  Edema: right- trace, left- trace    Sensory exam:  Monofilament sensation: normal  (minimum of 5 random plantar locations tested, avoiding callused areas - > 1 area with absence of sensation is + for neuropathy)    Plus at least one of the following:  Pulses: Normal  Pinprick: Intact  Proprioception: Intact  Vibration (128 Hz): Not Performed     Psychiatric:         Mood and Affect: Mood normal.         Behavior: Behavior normal.         Thought Content: Thought content normal.         Judgment: Judgment normal.          On this date 3/28/2023 I have spent 25 minutes reviewing previous notes, test results and face to face with the patient discussing the diagnosis and importance of compliance with the treatment plan as well as documenting on the day of the visit. An electronic signature was used to authenticate this note.     --Hima Leyva PA-C

## 2023-04-20 ENCOUNTER — TELEPHONE (OUTPATIENT)
Dept: INTERNAL MEDICINE CLINIC | Facility: CLINIC | Age: 78
End: 2023-04-20

## 2023-04-20 DIAGNOSIS — E11.65 UNCONTROLLED TYPE 2 DIABETES MELLITUS WITH HYPERGLYCEMIA (HCC): ICD-10-CM

## 2023-04-20 RX ORDER — INSULIN GLARGINE AND LIXISENATIDE 100; 33 U/ML; UG/ML
15 INJECTION, SOLUTION SUBCUTANEOUS EVERY MORNING
Qty: 15 ML | Refills: 0 | Status: CANCELLED | OUTPATIENT
Start: 2023-04-20

## 2023-04-20 NOTE — TELEPHONE ENCOUNTER
PA initiated on CMM through [facility] for [address] 100-33 units-mcg/mL pen injectors. CMM Key: [id number]. Waiting on determination.

## 2023-04-21 NOTE — TELEPHONE ENCOUNTER
Request Reference Number: [id number]. Stewart Bonilla 100/33 is approved through 12/31/2023. Your patient may now fill this prescription and it will be covered. Approval sent to be scanned into pt's chart. Pt notified via [address]

## 2023-05-03 DIAGNOSIS — E11.65 UNCONTROLLED TYPE 2 DIABETES MELLITUS WITH HYPERGLYCEMIA (HCC): ICD-10-CM

## 2023-05-03 DIAGNOSIS — E78.5 HYPERLIPIDEMIA LDL GOAL <100: ICD-10-CM

## 2023-05-03 DIAGNOSIS — I10 ESSENTIAL HYPERTENSION: ICD-10-CM

## 2023-05-03 LAB
ALBUMIN SERPL-MCNC: 4 G/DL (ref 3.2–4.6)
ALBUMIN/GLOB SERPL: 1.5 (ref 0.4–1.6)
ALP SERPL-CCNC: 58 U/L (ref 50–136)
ALT SERPL-CCNC: 49 U/L (ref 12–65)
ANION GAP SERPL CALC-SCNC: 3 MMOL/L (ref 2–11)
AST SERPL-CCNC: 27 U/L (ref 15–37)
BASOPHILS # BLD: 0 K/UL (ref 0–0.2)
BASOPHILS NFR BLD: 0 % (ref 0–2)
BILIRUB SERPL-MCNC: 0.3 MG/DL (ref 0.2–1.1)
BUN SERPL-MCNC: 26 MG/DL (ref 8–23)
CALCIUM SERPL-MCNC: 9.6 MG/DL (ref 8.3–10.4)
CHLORIDE SERPL-SCNC: 113 MMOL/L (ref 101–110)
CHOLEST SERPL-MCNC: 109 MG/DL
CO2 SERPL-SCNC: 24 MMOL/L (ref 21–32)
CREAT SERPL-MCNC: 1.1 MG/DL (ref 0.8–1.5)
DIFFERENTIAL METHOD BLD: NORMAL
EOSINOPHIL # BLD: 0.1 K/UL (ref 0–0.8)
EOSINOPHIL NFR BLD: 2 % (ref 0.5–7.8)
ERYTHROCYTE [DISTWIDTH] IN BLOOD BY AUTOMATED COUNT: 13 % (ref 11.9–14.6)
EST. AVERAGE GLUCOSE BLD GHB EST-MCNC: 189 MG/DL
GLOBULIN SER CALC-MCNC: 2.7 G/DL (ref 2.8–4.5)
GLUCOSE SERPL-MCNC: 226 MG/DL (ref 65–100)
HBA1C MFR BLD: 8.2 % (ref 4.8–5.6)
HCT VFR BLD AUTO: 43.3 % (ref 41.1–50.3)
HDLC SERPL-MCNC: 35 MG/DL (ref 40–60)
HDLC SERPL: 3.1
HGB BLD-MCNC: 14.2 G/DL (ref 13.6–17.2)
IMM GRANULOCYTES # BLD AUTO: 0.1 K/UL (ref 0–0.5)
IMM GRANULOCYTES NFR BLD AUTO: 1 % (ref 0–5)
LDLC SERPL CALC-MCNC: 41.6 MG/DL
LYMPHOCYTES # BLD: 2.6 K/UL (ref 0.5–4.6)
LYMPHOCYTES NFR BLD: 33 % (ref 13–44)
MCH RBC QN AUTO: 31.3 PG (ref 26.1–32.9)
MCHC RBC AUTO-ENTMCNC: 32.8 G/DL (ref 31.4–35)
MCV RBC AUTO: 95.4 FL (ref 82–102)
MONOCYTES # BLD: 0.6 K/UL (ref 0.1–1.3)
MONOCYTES NFR BLD: 7 % (ref 4–12)
NEUTS SEG # BLD: 4.5 K/UL (ref 1.7–8.2)
NEUTS SEG NFR BLD: 57 % (ref 43–78)
NRBC # BLD: 0 K/UL (ref 0–0.2)
PLATELET # BLD AUTO: 224 K/UL (ref 150–450)
PMV BLD AUTO: 9.8 FL (ref 9.4–12.3)
POTASSIUM SERPL-SCNC: 4.2 MMOL/L (ref 3.5–5.1)
PROT SERPL-MCNC: 6.7 G/DL (ref 6.3–8.2)
RBC # BLD AUTO: 4.54 M/UL (ref 4.23–5.6)
SODIUM SERPL-SCNC: 140 MMOL/L (ref 133–143)
TRIGL SERPL-MCNC: 162 MG/DL (ref 35–150)
VLDLC SERPL CALC-MCNC: 32.4 MG/DL (ref 6–23)
WBC # BLD AUTO: 7.8 K/UL (ref 4.3–11.1)

## 2023-05-09 ENCOUNTER — OFFICE VISIT (OUTPATIENT)
Dept: INTERNAL MEDICINE CLINIC | Facility: CLINIC | Age: 78
End: 2023-05-09
Payer: COMMERCIAL

## 2023-05-09 VITALS
WEIGHT: 226 LBS | SYSTOLIC BLOOD PRESSURE: 148 MMHG | TEMPERATURE: 97.1 F | DIASTOLIC BLOOD PRESSURE: 84 MMHG | HEART RATE: 69 BPM | OXYGEN SATURATION: 100 % | HEIGHT: 69 IN | BODY MASS INDEX: 33.47 KG/M2

## 2023-05-09 DIAGNOSIS — E78.5 HYPERLIPIDEMIA LDL GOAL <100: ICD-10-CM

## 2023-05-09 DIAGNOSIS — L57.0 ACTINIC KERATOSIS OF SCALP: ICD-10-CM

## 2023-05-09 DIAGNOSIS — E11.40 TYPE 2 DIABETES MELLITUS WITH DIABETIC NEUROPATHY, WITHOUT LONG-TERM CURRENT USE OF INSULIN (HCC): Primary | ICD-10-CM

## 2023-05-09 DIAGNOSIS — L82.1 SEBORRHEIC KERATOSIS: ICD-10-CM

## 2023-05-09 DIAGNOSIS — I10 ESSENTIAL HYPERTENSION: ICD-10-CM

## 2023-05-09 PROCEDURE — 99215 OFFICE O/P EST HI 40 MIN: CPT | Performed by: PHYSICIAN ASSISTANT

## 2023-05-09 PROCEDURE — 3077F SYST BP >= 140 MM HG: CPT | Performed by: PHYSICIAN ASSISTANT

## 2023-05-09 PROCEDURE — 3079F DIAST BP 80-89 MM HG: CPT | Performed by: PHYSICIAN ASSISTANT

## 2023-05-09 PROCEDURE — 1123F ACP DISCUSS/DSCN MKR DOCD: CPT | Performed by: PHYSICIAN ASSISTANT

## 2023-05-09 PROCEDURE — 3052F HG A1C>EQUAL 8.0%<EQUAL 9.0%: CPT | Performed by: PHYSICIAN ASSISTANT

## 2023-05-09 ASSESSMENT — PATIENT HEALTH QUESTIONNAIRE - PHQ9
1. LITTLE INTEREST OR PLEASURE IN DOING THINGS: 0
SUM OF ALL RESPONSES TO PHQ QUESTIONS 1-9: 0
2. FEELING DOWN, DEPRESSED OR HOPELESS: 0
SUM OF ALL RESPONSES TO PHQ QUESTIONS 1-9: 0
SUM OF ALL RESPONSES TO PHQ9 QUESTIONS 1 & 2: 0
SUM OF ALL RESPONSES TO PHQ QUESTIONS 1-9: 0
SUM OF ALL RESPONSES TO PHQ QUESTIONS 1-9: 0

## 2023-05-09 ASSESSMENT — ENCOUNTER SYMPTOMS
DIARRHEA: 1
SHORTNESS OF BREATH: 0

## 2023-05-09 NOTE — PATIENT INSTRUCTIONS
Increase Soliqua to 32 units every morning with gradual increase by 2 additional units every 2 weeks until fasting glucose range averages 110-120s or hypoglycemia occurs  Reviewed varying cost limitations and clarified that he would require both Victoza + Tresiba to replace what Taina Obregon is doing in a single injection  Discouraged from having \"extra\" snacks in the evening past handful of nuts and low carb ice cream bar  Monitor blood sugar 2-3 times/day and keep record to bring to next appointment  Continue chronic medications as prescribed  Maintain a regular exercise routine  Monitor blood pressure 1-2 times/week and keep record to bring to next appointment  Reminded that shingles vaccine (Shingrix) is free this year so if considering please proceed during the warmer months with this vaccine series  Provided information on previous dermatologist (Dr. Orestes Gomez) he has seen so he can call and get scheduled - advised to notify me if he needs another referral

## 2023-05-09 NOTE — PROGRESS NOTES
Neck:      Vascular: No carotid bruit. Cardiovascular:      Rate and Rhythm: Normal rate and regular rhythm. Heart sounds: Normal heart sounds. Pulmonary:      Effort: Pulmonary effort is normal.      Breath sounds: Normal breath sounds. Musculoskeletal:         General: Normal range of motion. Cervical back: Normal range of motion. Right lower leg: Edema (trace) present. Left lower leg: Edema (trace) present. Skin:     General: Skin is warm and dry. Neurological:      Mental Status: He is alert and oriented to person, place, and time. Psychiatric:         Mood and Affect: Mood normal.         Behavior: Behavior normal.         Thought Content: Thought content normal.         Judgment: Judgment normal.          On this date 5/9/2023 I have spent 50 minutes reviewing previous notes, test results and face to face with the patient discussing the diagnosis and importance of compliance with the treatment plan as well as documenting on the day of the visit. An electronic signature was used to authenticate this note.     --Ro Jc PA-C

## 2023-05-25 DIAGNOSIS — E11.65 UNCONTROLLED TYPE 2 DIABETES MELLITUS WITH HYPERGLYCEMIA (HCC): ICD-10-CM

## 2023-05-30 RX ORDER — INSULIN GLARGINE AND LIXISENATIDE 100; 33 U/ML; UG/ML
28 INJECTION, SOLUTION SUBCUTANEOUS EVERY MORNING
Qty: 9 ML | Refills: 5 | OUTPATIENT
Start: 2023-05-30

## 2023-06-30 RX ORDER — METFORMIN HYDROCHLORIDE 500 MG/1
TABLET, EXTENDED RELEASE ORAL
Qty: 180 TABLET | Refills: 1 | OUTPATIENT
Start: 2023-06-30

## 2023-07-03 RX ORDER — METFORMIN HYDROCHLORIDE 500 MG/1
500 TABLET, EXTENDED RELEASE ORAL 2 TIMES DAILY WITH MEALS
Qty: 180 TABLET | Refills: 1 | Status: SHIPPED | OUTPATIENT
Start: 2023-07-03

## 2023-07-14 DIAGNOSIS — I10 ESSENTIAL HYPERTENSION: ICD-10-CM

## 2023-07-14 DIAGNOSIS — E11.40 TYPE 2 DIABETES MELLITUS WITH DIABETIC NEUROPATHY, WITHOUT LONG-TERM CURRENT USE OF INSULIN (HCC): ICD-10-CM

## 2023-07-14 DIAGNOSIS — E78.5 HYPERLIPIDEMIA LDL GOAL <100: ICD-10-CM

## 2023-07-14 LAB
ALBUMIN SERPL-MCNC: 4.2 G/DL (ref 3.2–4.6)
ALBUMIN/GLOB SERPL: 1.4 (ref 0.4–1.6)
ALP SERPL-CCNC: 68 U/L (ref 50–136)
ALT SERPL-CCNC: 53 U/L (ref 12–65)
ANION GAP SERPL CALC-SCNC: 8 MMOL/L (ref 2–11)
AST SERPL-CCNC: 28 U/L (ref 15–37)
BILIRUB SERPL-MCNC: 0.4 MG/DL (ref 0.2–1.1)
BUN SERPL-MCNC: 25 MG/DL (ref 8–23)
CALCIUM SERPL-MCNC: 9.6 MG/DL (ref 8.3–10.4)
CHLORIDE SERPL-SCNC: 113 MMOL/L (ref 101–110)
CHOLEST SERPL-MCNC: 99 MG/DL
CO2 SERPL-SCNC: 23 MMOL/L (ref 21–32)
CREAT SERPL-MCNC: 1.1 MG/DL (ref 0.8–1.5)
GLOBULIN SER CALC-MCNC: 2.9 G/DL (ref 2.8–4.5)
GLUCOSE SERPL-MCNC: 134 MG/DL (ref 65–100)
HDLC SERPL-MCNC: 37 MG/DL (ref 40–60)
HDLC SERPL: 2.7
LDLC SERPL CALC-MCNC: 40.4 MG/DL
POTASSIUM SERPL-SCNC: 4.8 MMOL/L (ref 3.5–5.1)
PROT SERPL-MCNC: 7.1 G/DL (ref 6.3–8.2)
SODIUM SERPL-SCNC: 144 MMOL/L (ref 133–143)
TRIGL SERPL-MCNC: 108 MG/DL (ref 35–150)
VLDLC SERPL CALC-MCNC: 21.6 MG/DL (ref 6–23)

## 2023-07-15 LAB
EST. AVERAGE GLUCOSE BLD GHB EST-MCNC: 174 MG/DL
HBA1C MFR BLD: 7.7 % (ref 4.8–5.6)

## 2023-07-15 ASSESSMENT — PATIENT HEALTH QUESTIONNAIRE - PHQ9
SUM OF ALL RESPONSES TO PHQ9 QUESTIONS 1 & 2: 0
2. FEELING DOWN, DEPRESSED OR HOPELESS: NOT AT ALL
SUM OF ALL RESPONSES TO PHQ9 QUESTIONS 1 & 2: 0
SUM OF ALL RESPONSES TO PHQ QUESTIONS 1-9: 0
1. LITTLE INTEREST OR PLEASURE IN DOING THINGS: 0
2. FEELING DOWN, DEPRESSED OR HOPELESS: 0
1. LITTLE INTEREST OR PLEASURE IN DOING THINGS: NOT AT ALL

## 2023-07-18 ENCOUNTER — OFFICE VISIT (OUTPATIENT)
Dept: INTERNAL MEDICINE CLINIC | Facility: CLINIC | Age: 78
End: 2023-07-18
Payer: COMMERCIAL

## 2023-07-18 VITALS
SYSTOLIC BLOOD PRESSURE: 130 MMHG | OXYGEN SATURATION: 98 % | HEART RATE: 78 BPM | TEMPERATURE: 97.8 F | HEIGHT: 68 IN | DIASTOLIC BLOOD PRESSURE: 72 MMHG | BODY MASS INDEX: 33.8 KG/M2 | WEIGHT: 223 LBS

## 2023-07-18 DIAGNOSIS — I10 ESSENTIAL HYPERTENSION: ICD-10-CM

## 2023-07-18 DIAGNOSIS — E78.5 HYPERLIPIDEMIA LDL GOAL <100: ICD-10-CM

## 2023-07-18 DIAGNOSIS — Z79.4 TYPE 2 DIABETES MELLITUS WITH HYPERGLYCEMIA, WITH LONG-TERM CURRENT USE OF INSULIN (HCC): Primary | ICD-10-CM

## 2023-07-18 DIAGNOSIS — E11.65 TYPE 2 DIABETES MELLITUS WITH HYPERGLYCEMIA, WITH LONG-TERM CURRENT USE OF INSULIN (HCC): Primary | ICD-10-CM

## 2023-07-18 PROCEDURE — 3075F SYST BP GE 130 - 139MM HG: CPT | Performed by: PHYSICIAN ASSISTANT

## 2023-07-18 PROCEDURE — 99214 OFFICE O/P EST MOD 30 MIN: CPT | Performed by: PHYSICIAN ASSISTANT

## 2023-07-18 PROCEDURE — 3078F DIAST BP <80 MM HG: CPT | Performed by: PHYSICIAN ASSISTANT

## 2023-07-18 PROCEDURE — 1123F ACP DISCUSS/DSCN MKR DOCD: CPT | Performed by: PHYSICIAN ASSISTANT

## 2023-07-18 PROCEDURE — 3051F HG A1C>EQUAL 7.0%<8.0%: CPT | Performed by: PHYSICIAN ASSISTANT

## 2023-07-18 RX ORDER — PEN NEEDLE, DIABETIC 29 G X1/2"
NEEDLE, DISPOSABLE MISCELLANEOUS
COMMUNITY
Start: 2023-04-26 | End: 2023-07-18 | Stop reason: SDUPTHER

## 2023-07-18 RX ORDER — INSULIN GLARGINE AND LIXISENATIDE 100; 33 U/ML; UG/ML
40 INJECTION, SOLUTION SUBCUTANEOUS EVERY MORNING
Qty: 12 ML | Refills: 5 | Status: SHIPPED | OUTPATIENT
Start: 2023-07-18

## 2023-07-18 ASSESSMENT — ENCOUNTER SYMPTOMS: SHORTNESS OF BREATH: 0

## 2023-07-18 NOTE — PROGRESS NOTES
Sybil Clay (:  [id number]) is a 78 y.o. male,Established patient, here for evaluation of the following chief complaint(s):  Follow-up (Diabetes, Hypertension, Hyperlipidemia) and Discuss Labs         ASSESSMENT/PLAN:  1. Type 2 diabetes mellitus with hyperglycemia, with long-term current use of insulin (HCC)  -     Insulin Glargine-Lixisenatide (SOLIQUA) 100-33 UNT-MCG/ML SOPN; Inject 40 Units into the skin every morning, Disp-12 mL, R-5Normal  -     Comprehensive Metabolic Panel; Future  -     Hemoglobin A1C; Future  2. Essential hypertension  -     CBC with Auto Differential; Future  -     Comprehensive Metabolic Panel; Future  3. Hyperlipidemia LDL goal <100  -     Comprehensive Metabolic Panel; Future  -     Lipid Panel; Future      Patient Instructions   Increase Soliqua to 34 units every morning  Trial hold of evening Metformin x 2 weeks to see if diarrhea resolves  Asked patient to call me with an update in 2 weeks and if glucose values start to trend up or if fasting glucose is not consistently 130 or below we will gradually titrate Soliqua up 2 units weekly until goal is reached  Monitor blood sugar 3 times/day and keep record to bring to next appointment  Continue chronic medications as prescribed  Monitor blood pressure periodically to ensure that it remains consistently 130/80 or below  Reminded to stay well hydrated with plenty of water  Discussed that diabetic eye exam is due in August so should get scheduled if not already       Return in about 6 weeks (around 2023) for MWE + diabetes f/u. Subjective   SUBJECTIVE/OBJECTIVE:  The patient is a 78 y.o. male who is seen for follow up of diabetes. Current monitoring regimen: BGs are occasionally high in the morning.   Glucose monitoring frequency: 1-2 times daily  Home blood sugar records: fasting range: 125-180, pre-dinner range:   Any episodes of hypoglycemia? no  Known diabetic complications: peripheral

## 2023-07-18 NOTE — PATIENT INSTRUCTIONS
Increase Soliqua to 34 units every morning  Trial hold of evening Metformin x 2 weeks to see if diarrhea resolves  Asked patient to call me with an update in 2 weeks and if glucose values start to trend up or if fasting glucose is not consistently 130 or below we will gradually titrate Soliqua up 2 units weekly until goal is reached  Monitor blood sugar 3 times/day and keep record to bring to next appointment  Continue chronic medications as prescribed  Monitor blood pressure periodically to ensure that it remains consistently 130/80 or below  Reminded to stay well hydrated with plenty of water  Discussed that diabetic eye exam is due in August so should get scheduled if not already

## 2023-08-26 SDOH — HEALTH STABILITY: PHYSICAL HEALTH: ON AVERAGE, HOW MANY MINUTES DO YOU ENGAGE IN EXERCISE AT THIS LEVEL?: 30 MIN

## 2023-08-26 SDOH — HEALTH STABILITY: PHYSICAL HEALTH: ON AVERAGE, HOW MANY DAYS PER WEEK DO YOU ENGAGE IN MODERATE TO STRENUOUS EXERCISE (LIKE A BRISK WALK)?: 6 DAYS

## 2023-08-26 ASSESSMENT — PATIENT HEALTH QUESTIONNAIRE - PHQ9
2. FEELING DOWN, DEPRESSED OR HOPELESS: 0
SUM OF ALL RESPONSES TO PHQ QUESTIONS 1-9: 0
SUM OF ALL RESPONSES TO PHQ QUESTIONS 1-9: 0
SUM OF ALL RESPONSES TO PHQ9 QUESTIONS 1 & 2: 0
SUM OF ALL RESPONSES TO PHQ QUESTIONS 1-9: 0
SUM OF ALL RESPONSES TO PHQ QUESTIONS 1-9: 0
1. LITTLE INTEREST OR PLEASURE IN DOING THINGS: 0

## 2023-08-26 ASSESSMENT — LIFESTYLE VARIABLES
HOW OFTEN DO YOU HAVE SIX OR MORE DRINKS ON ONE OCCASION: 1
HOW OFTEN DO YOU HAVE A DRINK CONTAINING ALCOHOL: 2
HOW OFTEN DO YOU HAVE A DRINK CONTAINING ALCOHOL: MONTHLY OR LESS
HOW MANY STANDARD DRINKS CONTAINING ALCOHOL DO YOU HAVE ON A TYPICAL DAY: PATIENT DOES NOT DRINK
HOW MANY STANDARD DRINKS CONTAINING ALCOHOL DO YOU HAVE ON A TYPICAL DAY: 0

## 2023-08-28 ASSESSMENT — ENCOUNTER SYMPTOMS: SHORTNESS OF BREATH: 0

## 2023-08-29 ENCOUNTER — OFFICE VISIT (OUTPATIENT)
Dept: INTERNAL MEDICINE CLINIC | Facility: CLINIC | Age: 78
End: 2023-08-29
Payer: COMMERCIAL

## 2023-08-29 VITALS
SYSTOLIC BLOOD PRESSURE: 130 MMHG | WEIGHT: 224 LBS | HEART RATE: 76 BPM | TEMPERATURE: 97.2 F | BODY MASS INDEX: 33.95 KG/M2 | HEIGHT: 68 IN | OXYGEN SATURATION: 100 % | DIASTOLIC BLOOD PRESSURE: 80 MMHG

## 2023-08-29 DIAGNOSIS — Z79.4 TYPE 2 DIABETES MELLITUS WITH HYPERGLYCEMIA, WITH LONG-TERM CURRENT USE OF INSULIN (HCC): ICD-10-CM

## 2023-08-29 DIAGNOSIS — E11.65 TYPE 2 DIABETES MELLITUS WITH HYPERGLYCEMIA, WITH LONG-TERM CURRENT USE OF INSULIN (HCC): ICD-10-CM

## 2023-08-29 DIAGNOSIS — Z00.00 MEDICARE ANNUAL WELLNESS VISIT, SUBSEQUENT: Primary | ICD-10-CM

## 2023-08-29 PROBLEM — L98.9 SKIN LESION: Status: ACTIVE | Noted: 2019-07-15

## 2023-08-29 PROBLEM — I10 ESSENTIAL HYPERTENSION: Status: ACTIVE | Noted: 2017-06-15

## 2023-08-29 PROBLEM — E55.9 VITAMIN D DEFICIENCY: Status: ACTIVE | Noted: 2019-07-15

## 2023-08-29 PROBLEM — G47.00 INSOMNIA: Status: RESOLVED | Noted: 2017-06-15 | Resolved: 2023-08-29

## 2023-08-29 PROBLEM — H52.00 HYPERMETROPIA: Status: ACTIVE | Noted: 2020-08-26

## 2023-08-29 PROBLEM — E78.5 HYPERLIPIDEMIA: Status: ACTIVE | Noted: 2017-06-15

## 2023-08-29 PROBLEM — H52.4 PRESBYOPIA: Status: ACTIVE | Noted: 2020-08-26

## 2023-08-29 PROBLEM — M51.16 LUMBAR DISC DISEASE WITH RADICULOPATHY: Status: ACTIVE | Noted: 2017-10-17

## 2023-08-29 PROCEDURE — G0439 PPPS, SUBSEQ VISIT: HCPCS | Performed by: PHYSICIAN ASSISTANT

## 2023-08-29 PROCEDURE — 99213 OFFICE O/P EST LOW 20 MIN: CPT | Performed by: PHYSICIAN ASSISTANT

## 2023-08-29 PROCEDURE — 3075F SYST BP GE 130 - 139MM HG: CPT | Performed by: PHYSICIAN ASSISTANT

## 2023-08-29 PROCEDURE — 3079F DIAST BP 80-89 MM HG: CPT | Performed by: PHYSICIAN ASSISTANT

## 2023-08-29 PROCEDURE — 3051F HG A1C>EQUAL 7.0%<8.0%: CPT | Performed by: PHYSICIAN ASSISTANT

## 2023-08-29 PROCEDURE — 1123F ACP DISCUSS/DSCN MKR DOCD: CPT | Performed by: PHYSICIAN ASSISTANT

## 2023-08-29 ASSESSMENT — ENCOUNTER SYMPTOMS: DIARRHEA: 1

## 2023-08-29 NOTE — PATIENT INSTRUCTIONS
Reviewed the impact that emotional stress can have on his glucose which will take a while to resolve back to baseline with glucose impact  Advised to evaluate AM glucose readings in ~ 2-3 weeks and if still < 150 then increase Soliqua to 36 units every AM x 1 week then re-evaluate and if still frequently > 150 then increase another 2 units to 38 units every morning until I see him back again  Encouraged his to read through and fill out paperwork to designate power of  & end of life preferences - form given that he can fill out and will need to have witnessed by 2 non-family members  Monitor blood sugar daily and keep record to bring to next appointment  Continue chronic medications as prescribed  Reminded to stay well hydrated with plenty of water  Recommend annual flu vaccine in October for ideal coverage through the entire flu season  Advised to pay close attention to quantity of pens given when he fills [place] (should be 4) and how many pills of Jardiance he is getting for the $100 he is quoting - if #90 then he's doing as well as we can do for a branded medication     Preventing Falls: Care Instructions    Talk to your doctor about the medicines you take. Ask if any of them increase the risk of falls and whether they can be changed or stopped. Try to exercise regularly. It can help improve your strength and balance. This can help lower your risk of falling. Practice fall safety and prevention. Wear low-heeled shoes that fit well and give your feet good support. Talk to your doctor if you have foot problems that make this hard. Carry a cellphone or wear a medical alert device that you can use to call for help. Use stepladders instead of chairs to reach high objects. Don't climb if you're at risk for falls. Ask for help, if needed. Wear the correct eyeglasses, if you need them. Make your home safer. Remove rugs, cords, clutter, and furniture from walkways. Keep your house well lit.  Use

## 2023-10-25 DIAGNOSIS — I10 ESSENTIAL HYPERTENSION: ICD-10-CM

## 2023-10-25 DIAGNOSIS — Z79.4 TYPE 2 DIABETES MELLITUS WITH HYPERGLYCEMIA, WITH LONG-TERM CURRENT USE OF INSULIN (HCC): ICD-10-CM

## 2023-10-25 DIAGNOSIS — E11.65 TYPE 2 DIABETES MELLITUS WITH HYPERGLYCEMIA, WITH LONG-TERM CURRENT USE OF INSULIN (HCC): ICD-10-CM

## 2023-10-25 DIAGNOSIS — E78.5 HYPERLIPIDEMIA LDL GOAL <100: ICD-10-CM

## 2023-10-25 LAB
ALBUMIN SERPL-MCNC: 4.2 G/DL (ref 3.2–4.6)
ALBUMIN/GLOB SERPL: 1.4 (ref 0.4–1.6)
ALP SERPL-CCNC: 74 U/L (ref 50–136)
ALT SERPL-CCNC: 45 U/L (ref 12–65)
ANION GAP SERPL CALC-SCNC: 3 MMOL/L (ref 2–11)
AST SERPL-CCNC: 27 U/L (ref 15–37)
BASOPHILS # BLD: 0 K/UL (ref 0–0.2)
BASOPHILS NFR BLD: 0 % (ref 0–2)
BILIRUB SERPL-MCNC: 0.4 MG/DL (ref 0.2–1.1)
BUN SERPL-MCNC: 29 MG/DL (ref 8–23)
CALCIUM SERPL-MCNC: 9.2 MG/DL (ref 8.3–10.4)
CHLORIDE SERPL-SCNC: 110 MMOL/L (ref 101–110)
CHOLEST SERPL-MCNC: 133 MG/DL
CO2 SERPL-SCNC: 25 MMOL/L (ref 21–32)
CREAT SERPL-MCNC: 1 MG/DL (ref 0.8–1.5)
DIFFERENTIAL METHOD BLD: NORMAL
EOSINOPHIL # BLD: 0.1 K/UL (ref 0–0.8)
EOSINOPHIL NFR BLD: 2 % (ref 0.5–7.8)
ERYTHROCYTE [DISTWIDTH] IN BLOOD BY AUTOMATED COUNT: 12.7 % (ref 11.9–14.6)
EST. AVERAGE GLUCOSE BLD GHB EST-MCNC: 183 MG/DL
GLOBULIN SER CALC-MCNC: 3 G/DL (ref 2.8–4.5)
GLUCOSE SERPL-MCNC: 175 MG/DL (ref 65–100)
HBA1C MFR BLD: 8 % (ref 4.8–5.6)
HCT VFR BLD AUTO: 46.1 % (ref 41.1–50.3)
HDLC SERPL-MCNC: 39 MG/DL (ref 40–60)
HDLC SERPL: 3.4
HGB BLD-MCNC: 15 G/DL (ref 13.6–17.2)
IMM GRANULOCYTES # BLD AUTO: 0 K/UL (ref 0–0.5)
IMM GRANULOCYTES NFR BLD AUTO: 0 % (ref 0–5)
LDLC SERPL CALC-MCNC: 63.2 MG/DL
LYMPHOCYTES # BLD: 2.5 K/UL (ref 0.5–4.6)
LYMPHOCYTES NFR BLD: 33 % (ref 13–44)
MCH RBC QN AUTO: 31.1 PG (ref 26.1–32.9)
MCHC RBC AUTO-ENTMCNC: 32.5 G/DL (ref 31.4–35)
MCV RBC AUTO: 95.6 FL (ref 82–102)
MONOCYTES # BLD: 0.6 K/UL (ref 0.1–1.3)
MONOCYTES NFR BLD: 7 % (ref 4–12)
NEUTS SEG # BLD: 4.3 K/UL (ref 1.7–8.2)
NEUTS SEG NFR BLD: 57 % (ref 43–78)
NRBC # BLD: 0 K/UL (ref 0–0.2)
PLATELET # BLD AUTO: 195 K/UL (ref 150–450)
PMV BLD AUTO: 10.5 FL (ref 9.4–12.3)
POTASSIUM SERPL-SCNC: 4.3 MMOL/L (ref 3.5–5.1)
PROT SERPL-MCNC: 7.2 G/DL (ref 6.3–8.2)
RBC # BLD AUTO: 4.82 M/UL (ref 4.23–5.6)
SODIUM SERPL-SCNC: 138 MMOL/L (ref 133–143)
TRIGL SERPL-MCNC: 154 MG/DL (ref 35–150)
VLDLC SERPL CALC-MCNC: 30.8 MG/DL (ref 6–23)
WBC # BLD AUTO: 7.5 K/UL (ref 4.3–11.1)

## 2023-10-27 ASSESSMENT — ENCOUNTER SYMPTOMS: SHORTNESS OF BREATH: 0

## 2023-10-27 NOTE — PROGRESS NOTES
[id number], Afluria PF Quadrivalent [id number], Afluria Quadrivalent [id number], Fluarix PF Quadrivalent [id number], Flublok Quadrivalent [id number], Flucelvax PF Quadrivalent [id number], Flucelvax Quadrivalent [id number], FluLaval PF Quadrivalent [phone], Fluzone High-Dose Quadrivalent PF [id number], Fluzone PF Pediatric Quadrivalent [id number], Fluzone PF Quadrivalent [phone], Fluzone Quadrivalent [phone]  What is the most important information I should know about this vaccine? Influenza virus vaccine is made from \"killed viruses\" and will not cause you to become ill with the flu virus. What is influenza virus vaccine? Influenza virus (\"the flu\") is a contagious disease caused by a virus that can spread from one person to another through the air or on surfaces. Flu symptoms include fever, chills, tiredness, aches, sore throat, cough, vomiting, and diarrhea. The flu can also cause sinus infections, ear infections, bronchitis, or serious complications such as pneumonia. Influenza causes thousands of deaths each year, and hundreds of thousands of hospitalizations. Influenza is most dangerous in children, pregnant women, older adults, and people with weak immune systems or health problems such as diabetes, heart disease, or cancer. Influenza virus vaccine is for use in adults and children at least 6 months old, to prevent infection caused by influenza virus. This vaccine helps your body develop immunity to the disease, but will not treat an active infection you already have. Influenza virus vaccine is redeveloped each year to contain specific strains of inactivated (killed) flu virus that are recommended by public health officials for that year. The injectable influenza virus vaccine (flu shot) is made from \"killed viruses. \" Influenza virus vaccine is also available in a nasal spray form, which is a \"live virus\" vaccine. This medication guide addresses only the injectable form of this vaccine.   Like any vaccine,

## 2023-10-30 ENCOUNTER — OFFICE VISIT (OUTPATIENT)
Dept: INTERNAL MEDICINE CLINIC | Facility: CLINIC | Age: 78
End: 2023-10-30
Payer: COMMERCIAL

## 2023-10-30 VITALS
HEART RATE: 73 BPM | OXYGEN SATURATION: 100 % | DIASTOLIC BLOOD PRESSURE: 80 MMHG | TEMPERATURE: 97 F | SYSTOLIC BLOOD PRESSURE: 134 MMHG | WEIGHT: 225 LBS | BODY MASS INDEX: 34.1 KG/M2 | HEIGHT: 68 IN

## 2023-10-30 DIAGNOSIS — E11.42 TYPE 2 DIABETES MELLITUS WITH DIABETIC POLYNEUROPATHY, WITH LONG-TERM CURRENT USE OF INSULIN (HCC): Primary | ICD-10-CM

## 2023-10-30 DIAGNOSIS — Z23 NEED FOR IMMUNIZATION AGAINST INFLUENZA: ICD-10-CM

## 2023-10-30 DIAGNOSIS — I10 ESSENTIAL HYPERTENSION: ICD-10-CM

## 2023-10-30 DIAGNOSIS — Z79.4 TYPE 2 DIABETES MELLITUS WITH DIABETIC POLYNEUROPATHY, WITH LONG-TERM CURRENT USE OF INSULIN (HCC): Primary | ICD-10-CM

## 2023-10-30 DIAGNOSIS — E78.2 MIXED HYPERLIPIDEMIA: ICD-10-CM

## 2023-10-30 PROCEDURE — 90694 VACC AIIV4 NO PRSRV 0.5ML IM: CPT | Performed by: PHYSICIAN ASSISTANT

## 2023-10-30 PROCEDURE — 3074F SYST BP LT 130 MM HG: CPT | Performed by: PHYSICIAN ASSISTANT

## 2023-10-30 PROCEDURE — 99215 OFFICE O/P EST HI 40 MIN: CPT | Performed by: PHYSICIAN ASSISTANT

## 2023-10-30 PROCEDURE — 3052F HG A1C>EQUAL 8.0%<EQUAL 9.0%: CPT | Performed by: PHYSICIAN ASSISTANT

## 2023-10-30 PROCEDURE — 1123F ACP DISCUSS/DSCN MKR DOCD: CPT | Performed by: PHYSICIAN ASSISTANT

## 2023-10-30 PROCEDURE — 90471 IMMUNIZATION ADMIN: CPT | Performed by: PHYSICIAN ASSISTANT

## 2023-10-30 PROCEDURE — 3078F DIAST BP <80 MM HG: CPT | Performed by: PHYSICIAN ASSISTANT

## 2023-10-30 ASSESSMENT — PATIENT HEALTH QUESTIONNAIRE - PHQ9
SUM OF ALL RESPONSES TO PHQ QUESTIONS 1-9: 0
2. FEELING DOWN, DEPRESSED OR HOPELESS: 0
1. LITTLE INTEREST OR PLEASURE IN DOING THINGS: 0
SUM OF ALL RESPONSES TO PHQ QUESTIONS 1-9: 0
SUM OF ALL RESPONSES TO PHQ QUESTIONS 1-9: 0
SUM OF ALL RESPONSES TO PHQ9 QUESTIONS 1 & 2: 0
SUM OF ALL RESPONSES TO PHQ QUESTIONS 1-9: 0

## 2023-10-30 ASSESSMENT — ENCOUNTER SYMPTOMS: DIARRHEA: 1

## 2023-10-30 NOTE — PATIENT INSTRUCTIONS
Counseled to keep a detailed record of what foods are consumed the 6 hours prior to any elevated glucose reading (>150 fasting or pre-dinner) to help him identify any specific triggers since glucose is not consistently elevated at either time of day  Discussed the fact that sugar alcohols can negatively affect glucose as well in some individuals especially when consumed frequently or in large quantities - consider a month trial off carb smart ice cream bars which he typically consumes late evening  Challenged to eliminate snacking between meals to allow body to fully recover to baseline glycemic state before eating again  Reminded to stay well hydrated with plenty of water  Monitor blood sugar 3 times/day at minimum and keep record to bring to next appointment  Continue chronic medications as prescribed  Monitor blood pressure 1-2 times/week to ensure that it remains < 130/80 ideally      Patient Education        influenza virus vaccine (injection)  Pronunciation:  IN sarah CARPENTER seen  Brand:  Afluria PF Pediatric Quadrivalent [id number], Afluria PF Quadrivalent [id number], Afluria Quadrivalent [id number], Fluarix PF Quadrivalent [id number], Flublok Quadrivalent [phone], Flucelvax PF Quadrivalent [phone], Flucelvax Quadrivalent [id number], FluLaval PF Quadrivalent [id number], Fluzone High-Dose Quadrivalent PF [id number], Fluzone PF Pediatric Quadrivalent [phone], Fluzone PF Quadrivalent [phone], Fluzone Quadrivalent [phone]  What is the most important information I should know about this vaccine? Influenza virus vaccine is made from \"killed viruses\" and will not cause you to become ill with the flu virus. What is influenza virus vaccine? Influenza virus (\"the flu\") is a contagious disease caused by a virus that can spread from one person to another through the air or on surfaces. Flu symptoms include fever, chills, tiredness, aches, sore throat, cough, vomiting, and diarrhea.  The flu can also cause

## 2024-01-23 ENCOUNTER — HOSPITAL ENCOUNTER (OUTPATIENT)
Dept: LAB | Age: 79
Discharge: HOME | End: 2024-01-26

## 2024-01-23 DIAGNOSIS — E11.42 TYPE 2 DIABETES MELLITUS WITH DIABETIC POLYNEUROPATHY, WITH LONG-TERM CURRENT USE OF INSULIN (HCC): ICD-10-CM

## 2024-01-23 DIAGNOSIS — E78.2 MIXED HYPERLIPIDEMIA: ICD-10-CM

## 2024-01-23 DIAGNOSIS — I10 ESSENTIAL HYPERTENSION: ICD-10-CM

## 2024-01-23 DIAGNOSIS — Z79.4 TYPE 2 DIABETES MELLITUS WITH DIABETIC POLYNEUROPATHY, WITH LONG-TERM CURRENT USE OF INSULIN (HCC): ICD-10-CM

## 2024-01-23 LAB
ALBUMIN SERPL-MCNC: 4.3 G/DL (ref 3.2–4.6)
ALBUMIN/GLOB SERPL: 1.3 (ref 0.4–1.6)
ALP SERPL-CCNC: 72 U/L (ref 50–136)
ALT SERPL-CCNC: 49 U/L (ref 12–65)
ANION GAP SERPL CALC-SCNC: 4 MMOL/L (ref 2–11)
AST SERPL-CCNC: 27 U/L (ref 15–37)
BILIRUB SERPL-MCNC: 0.4 MG/DL (ref 0.2–1.1)
BUN SERPL-MCNC: 24 MG/DL (ref 8–23)
CALCIUM SERPL-MCNC: 9.2 MG/DL (ref 8.3–10.4)
CHLORIDE SERPL-SCNC: 114 MMOL/L (ref 103–113)
CHOLEST SERPL-MCNC: 110 MG/DL
CO2 SERPL-SCNC: 23 MMOL/L (ref 21–32)
CREAT SERPL-MCNC: 1.1 MG/DL (ref 0.8–1.5)
CREAT UR-MCNC: 74 MG/DL
EST. AVERAGE GLUCOSE BLD GHB EST-MCNC: 163 MG/DL
GLOBULIN SER CALC-MCNC: 3.3 G/DL (ref 2.8–4.5)
GLUCOSE SERPL-MCNC: 190 MG/DL (ref 65–100)
HBA1C MFR BLD: 7.3 % (ref 4.8–5.6)
HDLC SERPL-MCNC: 38 MG/DL (ref 40–60)
HDLC SERPL: 2.9
LDLC SERPL CALC-MCNC: 47.2 MG/DL
MICROALBUMIN UR-MCNC: 0.64 MG/DL
MICROALBUMIN/CREAT UR-RTO: 9 MG/G (ref 0–30)
POTASSIUM SERPL-SCNC: 4.2 MMOL/L (ref 3.5–5.1)
PROT SERPL-MCNC: 7.6 G/DL (ref 6.3–8.2)
SODIUM SERPL-SCNC: 141 MMOL/L (ref 136–146)
TRIGL SERPL-MCNC: 124 MG/DL (ref 35–150)
VLDLC SERPL CALC-MCNC: 24.8 MG/DL (ref 6–23)

## 2024-01-26 RX ORDER — REPAGLINIDE 0.5 MG/1
TABLET ORAL
Qty: 360 TABLET | Refills: 3 | Status: CANCELLED | OUTPATIENT
Start: 2024-01-26

## 2024-01-26 NOTE — PROGRESS NOTES
Albaro Rob (:  1945) is a 78 y.o. male,Established patient, here for evaluation of the following chief complaint(s):  Diabetes (Pt is here for a 3 month diabetic f/u with lab review. ), Hypertension, and Hyperlipidemia         ASSESSMENT/PLAN:  1. 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 Essential hypertension  -     ramipril (ALTACE) 5 MG capsule; Take 1 capsule by mouth every evening Take in addition to the 10 mg AM dose., Disp-90 capsule, R-5Normal  -     ramipril (ALTACE) 10 MG capsule; Take 1 capsule by mouth every morning, Disp-90 capsule, R-3Normal  -     CBC with Auto Differential; Future  -     Comprehensive Metabolic Panel; Future  3. Hyperlipidemia LDL goal <100  -     atorvastatin (LIPITOR) 20 MG tablet; Take 1 tablet by mouth nightly, Disp-90 tablet, R-3Normal  -     Comprehensive Metabolic Panel; Future  -     Lipid Panel; Future      Patient Instructions   Trial reduction of Lipitor to 20 mg nightly (can cut remaining 40 mg tablets in half or just finish them up then switch to the 20 mg dose)  Counseled to pay more attention/keep food diary of everything eaten the 12 hours prior to any diarrhea/bowel urgency episode  Also discussed that diarrhea is a common side effect with Prandin so also make mental note of whether Prandin has been used recently when diarrhea/bowel urgency occurs  Recommend testing for benefit of Prandin by monitoring glucose before eating pastry (the usual food item he utilizes Prandin for), 1 hour after and 2 hours after compared with those same values and food

## 2024-01-30 ENCOUNTER — OFFICE VISIT (OUTPATIENT)
Dept: INTERNAL MEDICINE CLINIC | Facility: CLINIC | Age: 79
End: 2024-01-30
Payer: COMMERCIAL

## 2024-01-30 VITALS
BODY MASS INDEX: 34.1 KG/M2 | HEART RATE: 72 BPM | HEIGHT: 68 IN | DIASTOLIC BLOOD PRESSURE: 70 MMHG | TEMPERATURE: 97.2 F | WEIGHT: 225 LBS | SYSTOLIC BLOOD PRESSURE: 130 MMHG | OXYGEN SATURATION: 100 %

## 2024-01-30 DIAGNOSIS — I10 ESSENTIAL HYPERTENSION: ICD-10-CM

## 2024-01-30 DIAGNOSIS — E78.5 HYPERLIPIDEMIA LDL GOAL <100: ICD-10-CM

## 2024-01-30 DIAGNOSIS — E11.41 TYPE 2 DIABETES MELLITUS WITH DIABETIC MONONEUROPATHY, WITH LONG-TERM CURRENT USE OF INSULIN (HCC): Primary | ICD-10-CM

## 2024-01-30 DIAGNOSIS — Z79.4 TYPE 2 DIABETES MELLITUS WITH DIABETIC MONONEUROPATHY, WITH LONG-TERM CURRENT USE OF INSULIN (HCC): Primary | ICD-10-CM

## 2024-01-30 PROCEDURE — 3075F SYST BP GE 130 - 139MM HG: CPT | Performed by: PHYSICIAN ASSISTANT

## 2024-01-30 PROCEDURE — 99215 OFFICE O/P EST HI 40 MIN: CPT | Performed by: PHYSICIAN ASSISTANT

## 2024-01-30 PROCEDURE — 3078F DIAST BP <80 MM HG: CPT | Performed by: PHYSICIAN ASSISTANT

## 2024-01-30 PROCEDURE — 1123F ACP DISCUSS/DSCN MKR DOCD: CPT | Performed by: PHYSICIAN ASSISTANT

## 2024-01-30 PROCEDURE — 3051F HG A1C>EQUAL 7.0%<8.0%: CPT | Performed by: PHYSICIAN ASSISTANT

## 2024-01-30 RX ORDER — INSULIN GLARGINE AND LIXISENATIDE 100; 33 U/ML; UG/ML
40 INJECTION, SOLUTION SUBCUTANEOUS EVERY MORNING
Qty: 12 ML | Refills: 5 | Status: SHIPPED | OUTPATIENT
Start: 2024-01-30

## 2024-01-30 RX ORDER — RAMIPRIL 10 MG/1
10 CAPSULE ORAL EVERY MORNING
Qty: 90 CAPSULE | Refills: 3 | Status: SHIPPED | OUTPATIENT
Start: 2024-01-30

## 2024-01-30 RX ORDER — RAMIPRIL 5 MG/1
5 CAPSULE ORAL EVERY EVENING
Qty: 90 CAPSULE | Refills: 5 | Status: SHIPPED | OUTPATIENT
Start: 2024-01-30

## 2024-01-30 RX ORDER — ATORVASTATIN CALCIUM 20 MG/1
20 TABLET, FILM COATED ORAL NIGHTLY
Qty: 90 TABLET | Refills: 3 | Status: SHIPPED | OUTPATIENT
Start: 2024-01-30

## 2024-01-30 RX ORDER — METFORMIN HYDROCHLORIDE 500 MG/1
500 TABLET, EXTENDED RELEASE ORAL
Qty: 90 TABLET | Refills: 3 | Status: SHIPPED | OUTPATIENT
Start: 2024-01-30

## 2024-01-30 ASSESSMENT — PATIENT HEALTH QUESTIONNAIRE - PHQ9
SUM OF ALL RESPONSES TO PHQ QUESTIONS 1-9: 0
SUM OF ALL RESPONSES TO PHQ9 QUESTIONS 1 & 2: 0
SUM OF ALL RESPONSES TO PHQ QUESTIONS 1-9: 0
SUM OF ALL RESPONSES TO PHQ QUESTIONS 1-9: 0
2. FEELING DOWN, DEPRESSED OR HOPELESS: 0
SUM OF ALL RESPONSES TO PHQ QUESTIONS 1-9: 0
1. LITTLE INTEREST OR PLEASURE IN DOING THINGS: 0

## 2024-01-30 ASSESSMENT — ENCOUNTER SYMPTOMS
BACK PAIN: 1
DIARRHEA: 1

## 2024-01-30 NOTE — PATIENT INSTRUCTIONS
Trial reduction of Lipitor to 20 mg nightly (can cut remaining 40 mg tablets in half or just finish them up then switch to the 20 mg dose)  Counseled to pay more attention/keep food diary of everything eaten the 12 hours prior to any diarrhea/bowel urgency episode  Also discussed that diarrhea is a common side effect with Prandin so also make mental note of whether Prandin has been used recently when diarrhea/bowel urgency occurs  Recommend testing for benefit of Prandin by monitoring glucose before eating pastry (the usual food item he utilizes Prandin for), 1 hour after and 2 hours after compared with those same values and food intake without use of Prandin  Reminded to stay well hydrated with plenty of water  Will delay further adjustments in Soliqua to see where things level out but if next A1c is not < 7% we may tweak the dose slightly in hopes of getting him to optimal goal  Monitor blood sugar twice daily and keep record to bring to next appointment  Continue chronic medications as prescribed  Monitor blood pressure 2-3 times/month and keep record to bring to next appointment

## 2024-05-14 ENCOUNTER — NURSE ONLY (OUTPATIENT)
Dept: INTERNAL MEDICINE CLINIC | Facility: CLINIC | Age: 79
End: 2024-05-14

## 2024-05-14 DIAGNOSIS — Z79.4 TYPE 2 DIABETES MELLITUS WITH DIABETIC MONONEUROPATHY, WITH LONG-TERM CURRENT USE OF INSULIN (HCC): ICD-10-CM

## 2024-05-14 DIAGNOSIS — E78.5 HYPERLIPIDEMIA LDL GOAL <100: ICD-10-CM

## 2024-05-14 DIAGNOSIS — I10 ESSENTIAL HYPERTENSION: ICD-10-CM

## 2024-05-14 DIAGNOSIS — E11.41 TYPE 2 DIABETES MELLITUS WITH DIABETIC MONONEUROPATHY, WITH LONG-TERM CURRENT USE OF INSULIN (HCC): ICD-10-CM

## 2024-05-14 LAB
ALBUMIN SERPL-MCNC: 4 G/DL (ref 3.2–4.6)
ALBUMIN/GLOB SERPL: 1.4 (ref 1–1.9)
ALP SERPL-CCNC: 68 U/L (ref 40–129)
ALT SERPL-CCNC: 31 U/L (ref 12–65)
ANION GAP SERPL CALC-SCNC: 13 MMOL/L (ref 9–18)
AST SERPL-CCNC: 29 U/L (ref 15–37)
BASOPHILS # BLD: 0 K/UL (ref 0–0.2)
BASOPHILS NFR BLD: 0 % (ref 0–2)
BILIRUB SERPL-MCNC: 0.3 MG/DL (ref 0–1.2)
BUN SERPL-MCNC: 27 MG/DL (ref 8–23)
CALCIUM SERPL-MCNC: 9.4 MG/DL (ref 8.8–10.2)
CHLORIDE SERPL-SCNC: 107 MMOL/L (ref 98–107)
CHOLEST SERPL-MCNC: 138 MG/DL (ref 0–200)
CO2 SERPL-SCNC: 22 MMOL/L (ref 20–28)
CREAT SERPL-MCNC: 1.05 MG/DL (ref 0.8–1.3)
DIFFERENTIAL METHOD BLD: NORMAL
EOSINOPHIL # BLD: 0.1 K/UL (ref 0–0.8)
EOSINOPHIL NFR BLD: 2 % (ref 0.5–7.8)
ERYTHROCYTE [DISTWIDTH] IN BLOOD BY AUTOMATED COUNT: 12.7 % (ref 11.9–14.6)
EST. AVERAGE GLUCOSE BLD GHB EST-MCNC: 189 MG/DL
GLOBULIN SER CALC-MCNC: 2.8 G/DL (ref 2.3–3.5)
GLUCOSE SERPL-MCNC: 141 MG/DL (ref 70–99)
HBA1C MFR BLD: 8.2 % (ref 0–5.6)
HCT VFR BLD AUTO: 44.3 % (ref 41.1–50.3)
HDLC SERPL-MCNC: 37 MG/DL (ref 40–60)
HDLC SERPL: 3.8 (ref 0–5)
HGB BLD-MCNC: 14.5 G/DL (ref 13.6–17.2)
IMM GRANULOCYTES # BLD AUTO: 0 K/UL (ref 0–0.5)
IMM GRANULOCYTES NFR BLD AUTO: 0 % (ref 0–5)
LDLC SERPL CALC-MCNC: 75 MG/DL (ref 0–100)
LYMPHOCYTES # BLD: 2.3 K/UL (ref 0.5–4.6)
LYMPHOCYTES NFR BLD: 34 % (ref 13–44)
MCH RBC QN AUTO: 31 PG (ref 26.1–32.9)
MCHC RBC AUTO-ENTMCNC: 32.7 G/DL (ref 31.4–35)
MCV RBC AUTO: 94.9 FL (ref 82–102)
MONOCYTES # BLD: 0.5 K/UL (ref 0.1–1.3)
MONOCYTES NFR BLD: 8 % (ref 4–12)
NEUTS SEG # BLD: 3.8 K/UL (ref 1.7–8.2)
NEUTS SEG NFR BLD: 56 % (ref 43–78)
NRBC # BLD: 0 K/UL (ref 0–0.2)
PLATELET # BLD AUTO: 200 K/UL (ref 150–450)
PMV BLD AUTO: 9.8 FL (ref 9.4–12.3)
POTASSIUM SERPL-SCNC: 4.1 MMOL/L (ref 3.5–5.1)
PROT SERPL-MCNC: 6.8 G/DL (ref 6.3–8.2)
RBC # BLD AUTO: 4.67 M/UL (ref 4.23–5.6)
SODIUM SERPL-SCNC: 141 MMOL/L (ref 136–145)
TRIGL SERPL-MCNC: 133 MG/DL (ref 0–150)
VLDLC SERPL CALC-MCNC: 27 MG/DL (ref 6–23)
WBC # BLD AUTO: 6.8 K/UL (ref 4.3–11.1)

## 2024-05-17 RX ORDER — REPAGLINIDE 0.5 MG/1
TABLET ORAL
Qty: 360 TABLET | Refills: 3 | Status: CANCELLED | OUTPATIENT
Start: 2024-05-17

## 2024-05-17 NOTE — PROGRESS NOTES
Albaro Rob (:  1945) is a 78 y.o. male,Established patient, here for evaluation of the following chief complaint(s):  Diabetes (Pt is here for a 3 month diabetic f/u with lab review.), Hypertension, and Hyperlipidemia      Assessment & Plan   1. Type 2 diabetes mellitus with diabetic polyneuropathy, with long-term current use of insulin (HCC)  -     Insulin Glargine-Lixisenatide (SOLIQUA) 100-33 UNT-MCG/ML SOPN; Inject 40 Units into the skin every morning, Disp-12 mL, R-5Normal  -     HM DIABETES FOOT EXAM  -     Comprehensive Metabolic Panel; Future  -     Hemoglobin A1C; Future  2. Elevated blood pressure reading in office with diagnosis of hypertension  -     Comprehensive Metabolic Panel; Future  3. Hyperlipidemia LDL goal <100  -     Comprehensive Metabolic Panel; Future  -     Lipid Panel; Future      Patient Instructions   Discussed the need to get a glimpse of what is happening between glucose checks and overnight so we can help determine best way to gain and maintain optimal glycemic control - will utilize Dexcom Pro device to evaluate deeper  Counseled that patient would qualify for his own device given that he's taking insulin if he should want it - significant benefit is usually present simply by utilizing a CGM with the constant awareness it brings   Asked to keep a detailed dietary log of food consumed during the 10 days that he is wearing the Dexcom Pro  Monitor blood pressure daily and keep record to bring to next appointment  Continue chronic medications as prescribed  Reminded to stay well hydrated with plenty of water  Monitor blood sugar 2 times/day and keep record to bring to next appointment  Will consider moving Soliqua back to first thing every AM if no other variables are unveiled as to why glucose ranges have recently increased      Return in about 3 weeks (around 2024) for Dexcom Pro review - no labs needed.       Subjective   HPI  The patient is a 78 y.o. male

## 2024-05-20 SDOH — ECONOMIC STABILITY: FOOD INSECURITY: WITHIN THE PAST 12 MONTHS, THE FOOD YOU BOUGHT JUST DIDN'T LAST AND YOU DIDN'T HAVE MONEY TO GET MORE.: NEVER TRUE

## 2024-05-20 SDOH — ECONOMIC STABILITY: INCOME INSECURITY: HOW HARD IS IT FOR YOU TO PAY FOR THE VERY BASICS LIKE FOOD, HOUSING, MEDICAL CARE, AND HEATING?: NOT VERY HARD

## 2024-05-20 SDOH — ECONOMIC STABILITY: TRANSPORTATION INSECURITY
IN THE PAST 12 MONTHS, HAS LACK OF TRANSPORTATION KEPT YOU FROM MEETINGS, WORK, OR FROM GETTING THINGS NEEDED FOR DAILY LIVING?: NO

## 2024-05-20 SDOH — ECONOMIC STABILITY: FOOD INSECURITY: WITHIN THE PAST 12 MONTHS, YOU WORRIED THAT YOUR FOOD WOULD RUN OUT BEFORE YOU GOT MONEY TO BUY MORE.: NEVER TRUE

## 2024-05-21 ENCOUNTER — OFFICE VISIT (OUTPATIENT)
Dept: INTERNAL MEDICINE CLINIC | Facility: CLINIC | Age: 79
End: 2024-05-21
Payer: COMMERCIAL

## 2024-05-21 VITALS
DIASTOLIC BLOOD PRESSURE: 76 MMHG | HEART RATE: 84 BPM | TEMPERATURE: 97.1 F | BODY MASS INDEX: 34.4 KG/M2 | OXYGEN SATURATION: 98 % | HEIGHT: 68 IN | WEIGHT: 227 LBS | SYSTOLIC BLOOD PRESSURE: 144 MMHG

## 2024-05-21 DIAGNOSIS — I10 ELEVATED BLOOD PRESSURE READING IN OFFICE WITH DIAGNOSIS OF HYPERTENSION: ICD-10-CM

## 2024-05-21 DIAGNOSIS — E78.5 HYPERLIPIDEMIA LDL GOAL <100: ICD-10-CM

## 2024-05-21 DIAGNOSIS — E11.42 TYPE 2 DIABETES MELLITUS WITH DIABETIC POLYNEUROPATHY, WITH LONG-TERM CURRENT USE OF INSULIN (HCC): Primary | ICD-10-CM

## 2024-05-21 DIAGNOSIS — Z79.4 TYPE 2 DIABETES MELLITUS WITH DIABETIC POLYNEUROPATHY, WITH LONG-TERM CURRENT USE OF INSULIN (HCC): Primary | ICD-10-CM

## 2024-05-21 PROCEDURE — 3077F SYST BP >= 140 MM HG: CPT | Performed by: PHYSICIAN ASSISTANT

## 2024-05-21 PROCEDURE — 3078F DIAST BP <80 MM HG: CPT | Performed by: PHYSICIAN ASSISTANT

## 2024-05-21 PROCEDURE — 3052F HG A1C>EQUAL 8.0%<EQUAL 9.0%: CPT | Performed by: PHYSICIAN ASSISTANT

## 2024-05-21 PROCEDURE — 99215 OFFICE O/P EST HI 40 MIN: CPT | Performed by: PHYSICIAN ASSISTANT

## 2024-05-21 PROCEDURE — 1123F ACP DISCUSS/DSCN MKR DOCD: CPT | Performed by: PHYSICIAN ASSISTANT

## 2024-05-21 RX ORDER — INSULIN GLARGINE AND LIXISENATIDE 100; 33 U/ML; UG/ML
40 INJECTION, SOLUTION SUBCUTANEOUS EVERY MORNING
Qty: 12 ML | Refills: 5 | Status: SHIPPED | OUTPATIENT
Start: 2024-05-21

## 2024-05-21 ASSESSMENT — PATIENT HEALTH QUESTIONNAIRE - PHQ9
2. FEELING DOWN, DEPRESSED OR HOPELESS: NOT AT ALL
1. LITTLE INTEREST OR PLEASURE IN DOING THINGS: NOT AT ALL
SUM OF ALL RESPONSES TO PHQ QUESTIONS 1-9: 0
SUM OF ALL RESPONSES TO PHQ9 QUESTIONS 1 & 2: 0
SUM OF ALL RESPONSES TO PHQ QUESTIONS 1-9: 0

## 2024-05-21 NOTE — PATIENT INSTRUCTIONS
Discussed the need to get a glimpse of what is happening between glucose checks and overnight so we can help determine best way to gain and maintain optimal glycemic control - will utilize Dexcom Pro device to evaluate deeper  Counseled that patient would qualify for his own device given that he's taking insulin if he should want it - significant benefit is usually present simply by utilizing a CGM with the constant awareness it brings   Asked to keep a detailed dietary log of food consumed during the 10 days that he is wearing the Dexcom Pro  Monitor blood pressure daily and keep record to bring to next appointment  Continue chronic medications as prescribed  Reminded to stay well hydrated with plenty of water  Monitor blood sugar 2 times/day and keep record to bring to next appointment  Will consider moving Soliqua back to first thing every AM if no other variables are unveiled as to why glucose ranges have recently increased

## 2024-05-21 NOTE — PROGRESS NOTES
Applied Dexcom G6 Pro sensor and transmitter on pt's L lower abd. Checked status of transmitter via clinic's  and received notification that connection was successful. Pt educated on Dexcom G6 and education handouts given. Pt verbalized understanding and has no further questions at this time. Results will be reviewed with Mattie Shaffer PA-C at upcoming visit on 6/4/2024.

## 2024-05-30 NOTE — PROGRESS NOTES
Albaro Rob (:  1945) is a 78 y.o. male,Established patient, here for evaluation of the following chief complaint(s):  Diabetes (Pt is here to review his Dexcom G6 Pro results. )      Assessment & Plan   1. Type 2 diabetes mellitus with diabetic polyneuropathy, with long-term current use of insulin ([facility])      Patient Instructions   Move dose of honey to breakfast  Increase Soliqua to 44 units every morning and move back to first thing upon awakening.  Increase further to 48 units if fasting glucose is consistently > 130 after 2 weeks on 44 units daily  Move Jardiance to breakfast time  Advised more common use of Prandin with dinner meal unless none to very little carbs are consumed - discovered that fried chicken causes glucose excursion for him  Recommend restricting intake of carbohydrates to 1 type/meal  Narrow the eating window in the evening from 3 hours to 1.5 hours  Avoid pretzels at night since evening snack is so close to dinner and glucose is still high from evening meal and doesn't need to have any additional carbohydrates to cause further elevation  Monitor blood sugar 3 times/day and keep record to bring to next appointment  Discussed option to own his own CGM which would improve glycemic control solely from awareness of what glucose is doing with certain foods consumed  Praised exercise efforts and urged to maintain this      Return in about 2 months (around 2024) for diabetes f/u.       Subjective   HPI  The patient is a 78 y.o. male who is seen for follow up of diabetes.  Current monitoring regimen:  BGs are elevated for an extended period after dinner .  Glucose monitoring frequency: 2 times daily  Home blood sugar records: fasting range: 120-180, pre-dinner range: 115-140, postprandial range: 180-230  Any episodes of hypoglycemia? no  Known diabetic complications: peripheral neuropathy  Current diabetic medications include: oral agents (triple therapy): Glucophage  mg

## 2024-06-04 ENCOUNTER — OFFICE VISIT (OUTPATIENT)
Dept: INTERNAL MEDICINE CLINIC | Facility: CLINIC | Age: 79
End: 2024-06-04

## 2024-06-04 VITALS
SYSTOLIC BLOOD PRESSURE: 138 MMHG | BODY MASS INDEX: 34.4 KG/M2 | HEIGHT: 68 IN | TEMPERATURE: 97.3 F | OXYGEN SATURATION: 96 % | HEART RATE: 81 BPM | DIASTOLIC BLOOD PRESSURE: 68 MMHG | WEIGHT: 227 LBS

## 2024-06-04 DIAGNOSIS — Z79.4 TYPE 2 DIABETES MELLITUS WITH DIABETIC POLYNEUROPATHY, WITH LONG-TERM CURRENT USE OF INSULIN (HCC): Primary | ICD-10-CM

## 2024-06-04 DIAGNOSIS — E11.42 TYPE 2 DIABETES MELLITUS WITH DIABETIC POLYNEUROPATHY, WITH LONG-TERM CURRENT USE OF INSULIN (HCC): Primary | ICD-10-CM

## 2024-06-04 ASSESSMENT — PATIENT HEALTH QUESTIONNAIRE - PHQ9
SUM OF ALL RESPONSES TO PHQ QUESTIONS 1-9: 0
SUM OF ALL RESPONSES TO PHQ9 QUESTIONS 1 & 2: 0
SUM OF ALL RESPONSES TO PHQ QUESTIONS 1-9: 0
2. FEELING DOWN, DEPRESSED OR HOPELESS: NOT AT ALL
1. LITTLE INTEREST OR PLEASURE IN DOING THINGS: NOT AT ALL

## 2024-06-05 ENCOUNTER — TELEPHONE (OUTPATIENT)
Dept: INTERNAL MEDICINE CLINIC | Facility: CLINIC | Age: 79
End: 2024-06-05

## 2024-06-05 NOTE — TELEPHONE ENCOUNTER
----- Message from Mattie Shaffer PA-C sent at 6/4/2024 11:03 PM EDT -----  Can you call the patient and have him list his glucose readings with time of day over the past 2 weeks?  I didn't plan on documenting from his personal glucometer since we had the CGM data but I'd like to have it in the note afterall for comparison to CGM details.  He usually checks fasting every day and then sometimes after eating so just need a list of those with notation as to whether it was before or after eating and which meal/time of day.  Thanks and sorry...

## 2024-06-05 NOTE — PATIENT INSTRUCTIONS
Move dose of honey to breakfast  Increase Soliqua to 44 units every morning and move back to first thing upon awakening.  Increase further to 48 units if fasting glucose is consistently > 130 after 2 weeks on 44 units daily  Move Jardiance to breakfast time  Advised more common use of Prandin with dinner meal unless none to very little carbs are consumed - discovered that fried chicken causes glucose excursion for him  Recommend restricting intake of carbohydrates to 1 type/meal  Narrow the eating window in the evening from 3 hours to 1.5 hours  Avoid pretzels at night since evening snack is so close to dinner and glucose is still high from evening meal and doesn't need to have any additional carbohydrates to cause further elevation  Monitor blood sugar 3 times/day and keep record to bring to next appointment  Discussed option to own his own CGM which would improve glycemic control solely from awareness of what glucose is doing with certain foods consumed  Praised exercise efforts and urged to maintain this

## 2024-06-05 NOTE — TELEPHONE ENCOUNTER
6/4: 8:19 AM- 154    6:00 PM- 110       6/3: 8:00 AM- 148    6:00 PM- 186  6/2: 7:15 AM- 123  6/1: 8:00 AM- 197    7:00 PM- 119      5/31: 8:00 AM- 175     5/30: 8:30 AM- 173  5/29: 8:30 AM- 144   6:00 PM- 170      5/28: 8:00 AM- 215   6:30 PM- 115       5/27: 8:00 AM- 172   6:15 PM- 136      5/26: 7:30 AM- 160   7:00 PM- 229       5/25: 8:00 AM- 192  5/24: 8:15 AM- 163       5/23: 8:00 AM- 154    5:00 PM- 94        5/22: 7:00 AM- 128    6:18 PM- 111       5/21: 7:30 AM- 167    5:00 PM- 132               Pt states that morning readings are fasting and evening readings were all taken prior to eating dinner.

## 2024-06-18 ENCOUNTER — TELEPHONE (OUTPATIENT)
Dept: INTERNAL MEDICINE CLINIC | Facility: CLINIC | Age: 79
End: 2024-06-18

## 2024-06-18 DIAGNOSIS — Z79.4 TYPE 2 DIABETES MELLITUS WITH DIABETIC MONONEUROPATHY, WITH LONG-TERM CURRENT USE OF INSULIN (HCC): ICD-10-CM

## 2024-06-18 DIAGNOSIS — E11.41 TYPE 2 DIABETES MELLITUS WITH DIABETIC MONONEUROPATHY, WITH LONG-TERM CURRENT USE OF INSULIN (HCC): ICD-10-CM

## 2024-06-18 NOTE — TELEPHONE ENCOUNTER
Pt notified that 4 boxes of samples of Jardiance 25 mg have been set aside for him to pick-up at his convenience. Pt verbalized understanding.     Lot: [id number]  Exp: 6/2026

## 2024-06-19 RX ORDER — METFORMIN HYDROCHLORIDE 500 MG/1
500 TABLET, EXTENDED RELEASE ORAL
Qty: 90 TABLET | Refills: 3 | Status: SHIPPED | OUTPATIENT
Start: 2024-06-19

## 2024-08-01 ENCOUNTER — LAB (OUTPATIENT)
Dept: INTERNAL MEDICINE CLINIC | Facility: CLINIC | Age: 79
End: 2024-08-01

## 2024-08-01 DIAGNOSIS — Z79.4 TYPE 2 DIABETES MELLITUS WITH DIABETIC POLYNEUROPATHY, WITH LONG-TERM CURRENT USE OF INSULIN (HCC): ICD-10-CM

## 2024-08-01 DIAGNOSIS — I10 ELEVATED BLOOD PRESSURE READING IN OFFICE WITH DIAGNOSIS OF HYPERTENSION: ICD-10-CM

## 2024-08-01 DIAGNOSIS — E78.5 HYPERLIPIDEMIA LDL GOAL <100: ICD-10-CM

## 2024-08-01 DIAGNOSIS — E11.42 TYPE 2 DIABETES MELLITUS WITH DIABETIC POLYNEUROPATHY, WITH LONG-TERM CURRENT USE OF INSULIN (HCC): ICD-10-CM

## 2024-08-01 PROBLEM — L98.9 SKIN LESION: Status: RESOLVED | Noted: 2019-07-15 | Resolved: 2024-08-01

## 2024-08-01 LAB
ALBUMIN SERPL-MCNC: 3.9 G/DL (ref 3.2–4.6)
ALBUMIN/GLOB SERPL: 1.2 (ref 1–1.9)
ALP SERPL-CCNC: 62 U/L (ref 40–129)
ALT SERPL-CCNC: 34 U/L (ref 12–65)
ANION GAP SERPL CALC-SCNC: 11 MMOL/L (ref 9–18)
AST SERPL-CCNC: 30 U/L (ref 15–37)
BILIRUB SERPL-MCNC: 0.3 MG/DL (ref 0–1.2)
BUN SERPL-MCNC: 25 MG/DL (ref 8–23)
CALCIUM SERPL-MCNC: 9.4 MG/DL (ref 8.8–10.2)
CHLORIDE SERPL-SCNC: 108 MMOL/L (ref 98–107)
CHOLEST SERPL-MCNC: 133 MG/DL (ref 0–200)
CO2 SERPL-SCNC: 22 MMOL/L (ref 20–28)
CREAT SERPL-MCNC: 1.12 MG/DL (ref 0.8–1.3)
EST. AVERAGE GLUCOSE BLD GHB EST-MCNC: 183 MG/DL
GLOBULIN SER CALC-MCNC: 3.1 G/DL (ref 2.3–3.5)
GLUCOSE SERPL-MCNC: 200 MG/DL (ref 70–99)
HBA1C MFR BLD: 8 % (ref 0–5.6)
HDLC SERPL-MCNC: 34 MG/DL (ref 40–60)
HDLC SERPL: 4 (ref 0–5)
LDLC SERPL CALC-MCNC: 71 MG/DL (ref 0–100)
POTASSIUM SERPL-SCNC: 4.4 MMOL/L (ref 3.5–5.1)
PROT SERPL-MCNC: 7 G/DL (ref 6.3–8.2)
SODIUM SERPL-SCNC: 141 MMOL/L (ref 136–145)
TRIGL SERPL-MCNC: 142 MG/DL (ref 0–150)
VLDLC SERPL CALC-MCNC: 28 MG/DL (ref 6–23)

## 2024-08-01 RX ORDER — RAMIPRIL 5 MG/1
5 CAPSULE ORAL EVERY EVENING
Qty: 90 CAPSULE | Refills: 5 | Status: CANCELLED | OUTPATIENT
Start: 2024-08-01

## 2024-08-01 RX ORDER — REPAGLINIDE 0.5 MG/1
TABLET ORAL
Qty: 360 TABLET | Refills: 3 | Status: CANCELLED | OUTPATIENT
Start: 2024-08-01

## 2024-08-01 NOTE — PROGRESS NOTES
Albaro Rob (:  1945) is a 78 y.o. male,Established patient, here for evaluation of the following chief complaint(s):  Diabetes (Pt is here for a 2 month diabetic f/u with lab review. ), Hypertension, and Hyperlipidemia      Assessment & Plan   1. Type 2 diabetes mellitus with diabetic polyneuropathy, with long-term current use of insulin (HCC)  -     empagliflozin (JARDIANCE) 25 MG tablet; Take 1 tablet by mouth daily (with breakfast), Disp-90 tablet, R-3Print  -     empagliflozin (JARDIANCE) 25 MG tablet; Take 1 tablet by mouth daily, Disp-14 tablet, R-0Print  -     Comprehensive Metabolic Panel; Future  -     Hemoglobin A1C; Future  2. Essential (primary) hypertension  -     CBC with Auto Differential; Future  -     Comprehensive Metabolic Panel; Future  3. Hyperlipidemia LDL goal <100  -     Comprehensive Metabolic Panel; Future  -     Lipid Panel; Future      Patient Instructions   Reminded of previous instruction to use Prandin before dinner on a consistent basis unless he is having an extra healthy meal that does not include any carbohydrates  Discussed potential need to increase Soliqua dose by 2-4 units if fasting glucose values don't improve with more consistent Prandin use with dinner  Will monitor for any increased complaints/issues with diarrhea when Prandin & Metformin ER are used together with dinner more regularly  Suggest spreading out volume of water consumed daily to be finished earlier in the evening to avoid such large volume impact over a short time on his bladder  Monitor blood sugar 2-3 times/day and keep record to bring to next appointment  Continue chronic medications as prescribed  Monitor blood pressure 2 times/week minimum and keep record to bring to next appointment  Recommend waiting until October to receive annual flu vaccine to ensure optimal protection during the entire flu season  Provided a 14 day free voucher for Jardiance with separate prescription for the

## 2024-08-05 ENCOUNTER — OFFICE VISIT (OUTPATIENT)
Dept: INTERNAL MEDICINE CLINIC | Facility: CLINIC | Age: 79
End: 2024-08-05
Payer: COMMERCIAL

## 2024-08-05 VITALS
SYSTOLIC BLOOD PRESSURE: 120 MMHG | BODY MASS INDEX: 34.1 KG/M2 | TEMPERATURE: 97.5 F | HEART RATE: 85 BPM | OXYGEN SATURATION: 97 % | DIASTOLIC BLOOD PRESSURE: 70 MMHG | WEIGHT: 225 LBS | HEIGHT: 68 IN

## 2024-08-05 DIAGNOSIS — Z79.4 TYPE 2 DIABETES MELLITUS WITH DIABETIC POLYNEUROPATHY, WITH LONG-TERM CURRENT USE OF INSULIN (HCC): Primary | ICD-10-CM

## 2024-08-05 DIAGNOSIS — E78.5 HYPERLIPIDEMIA LDL GOAL <100: ICD-10-CM

## 2024-08-05 DIAGNOSIS — I10 ESSENTIAL (PRIMARY) HYPERTENSION: ICD-10-CM

## 2024-08-05 DIAGNOSIS — E11.42 TYPE 2 DIABETES MELLITUS WITH DIABETIC POLYNEUROPATHY, WITH LONG-TERM CURRENT USE OF INSULIN (HCC): Primary | ICD-10-CM

## 2024-08-05 PROCEDURE — 99215 OFFICE O/P EST HI 40 MIN: CPT | Performed by: PHYSICIAN ASSISTANT

## 2024-08-05 PROCEDURE — G2211 COMPLEX E/M VISIT ADD ON: HCPCS | Performed by: PHYSICIAN ASSISTANT

## 2024-08-05 PROCEDURE — 3078F DIAST BP <80 MM HG: CPT | Performed by: PHYSICIAN ASSISTANT

## 2024-08-05 PROCEDURE — 1123F ACP DISCUSS/DSCN MKR DOCD: CPT | Performed by: PHYSICIAN ASSISTANT

## 2024-08-05 PROCEDURE — 3074F SYST BP LT 130 MM HG: CPT | Performed by: PHYSICIAN ASSISTANT

## 2024-08-05 PROCEDURE — 3052F HG A1C>EQUAL 8.0%<EQUAL 9.0%: CPT | Performed by: PHYSICIAN ASSISTANT

## 2024-08-05 ASSESSMENT — PATIENT HEALTH QUESTIONNAIRE - PHQ9
SUM OF ALL RESPONSES TO PHQ QUESTIONS 1-9: 0
2. FEELING DOWN, DEPRESSED OR HOPELESS: NOT AT ALL
1. LITTLE INTEREST OR PLEASURE IN DOING THINGS: NOT AT ALL
SUM OF ALL RESPONSES TO PHQ QUESTIONS 1-9: 0
SUM OF ALL RESPONSES TO PHQ9 QUESTIONS 1 & 2: 0
SUM OF ALL RESPONSES TO PHQ QUESTIONS 1-9: 0
SUM OF ALL RESPONSES TO PHQ QUESTIONS 1-9: 0

## 2024-08-05 NOTE — PATIENT INSTRUCTIONS
Reminded of previous instruction to use Prandin before dinner on a consistent basis unless he is having an extra healthy meal that does not include any carbohydrates  Discussed potential need to increase Soliqua dose by 2-4 units if fasting glucose values don't improve with more consistent Prandin use with dinner  Will monitor for any increased complaints/issues with diarrhea when Prandin & Metformin ER are used together with dinner more regularly  Suggest spreading out volume of water consumed daily to be finished earlier in the evening to avoid such large volume impact over a short time on his bladder  Monitor blood sugar 2-3 times/day and keep record to bring to next appointment  Continue chronic medications as prescribed  Monitor blood pressure 2 times/week minimum and keep record to bring to next appointment  Recommend waiting until October to receive annual flu vaccine to ensure optimal protection during the entire flu season  Provided a 14 day free voucher for Jardiance with separate prescription for the #14 quantity

## 2024-09-12 DIAGNOSIS — E11.42 TYPE 2 DIABETES MELLITUS WITH DIABETIC POLYNEUROPATHY, WITH LONG-TERM CURRENT USE OF INSULIN (HCC): ICD-10-CM

## 2024-09-12 DIAGNOSIS — Z79.4 TYPE 2 DIABETES MELLITUS WITH DIABETIC POLYNEUROPATHY, WITH LONG-TERM CURRENT USE OF INSULIN (HCC): ICD-10-CM

## 2024-10-31 ENCOUNTER — LAB (OUTPATIENT)
Dept: INTERNAL MEDICINE CLINIC | Facility: CLINIC | Age: 79
End: 2024-10-31

## 2024-10-31 DIAGNOSIS — E11.42 TYPE 2 DIABETES MELLITUS WITH DIABETIC POLYNEUROPATHY, WITH LONG-TERM CURRENT USE OF INSULIN (HCC): ICD-10-CM

## 2024-10-31 DIAGNOSIS — E78.5 HYPERLIPIDEMIA LDL GOAL <100: ICD-10-CM

## 2024-10-31 DIAGNOSIS — I10 ESSENTIAL (PRIMARY) HYPERTENSION: ICD-10-CM

## 2024-10-31 DIAGNOSIS — Z79.4 TYPE 2 DIABETES MELLITUS WITH DIABETIC POLYNEUROPATHY, WITH LONG-TERM CURRENT USE OF INSULIN (HCC): ICD-10-CM

## 2024-10-31 LAB
ALBUMIN SERPL-MCNC: 3.9 G/DL (ref 3.2–4.6)
ALBUMIN/GLOB SERPL: 1.2 (ref 1–1.9)
ALP SERPL-CCNC: 59 U/L (ref 40–129)
ALT SERPL-CCNC: 32 U/L (ref 8–55)
ANION GAP SERPL CALC-SCNC: 13 MMOL/L (ref 7–16)
AST SERPL-CCNC: 31 U/L (ref 15–37)
BASOPHILS # BLD: 0 K/UL (ref 0–0.2)
BASOPHILS NFR BLD: 0 % (ref 0–2)
BILIRUB SERPL-MCNC: 0.3 MG/DL (ref 0–1.2)
BUN SERPL-MCNC: 28 MG/DL (ref 8–23)
CALCIUM SERPL-MCNC: 9.7 MG/DL (ref 8.8–10.2)
CHLORIDE SERPL-SCNC: 107 MMOL/L (ref 98–107)
CHOLEST SERPL-MCNC: 140 MG/DL (ref 0–200)
CO2 SERPL-SCNC: 21 MMOL/L (ref 20–29)
CREAT SERPL-MCNC: 1.12 MG/DL (ref 0.8–1.3)
DIFFERENTIAL METHOD BLD: NORMAL
EOSINOPHIL # BLD: 0.1 K/UL (ref 0–0.8)
EOSINOPHIL NFR BLD: 2 % (ref 0.5–7.8)
ERYTHROCYTE [DISTWIDTH] IN BLOOD BY AUTOMATED COUNT: 13 % (ref 11.9–14.6)
EST. AVERAGE GLUCOSE BLD GHB EST-MCNC: 185 MG/DL
GLOBULIN SER CALC-MCNC: 3.3 G/DL (ref 2.3–3.5)
GLUCOSE SERPL-MCNC: 157 MG/DL (ref 70–99)
HBA1C MFR BLD: 8.1 % (ref 0–5.6)
HCT VFR BLD AUTO: 44.4 % (ref 41.1–50.3)
HDLC SERPL-MCNC: 39 MG/DL (ref 40–60)
HDLC SERPL: 3.6 (ref 0–5)
HGB BLD-MCNC: 14.7 G/DL (ref 13.6–17.2)
IMM GRANULOCYTES # BLD AUTO: 0 K/UL (ref 0–0.5)
IMM GRANULOCYTES NFR BLD AUTO: 0 % (ref 0–5)
LDLC SERPL CALC-MCNC: 75 MG/DL (ref 0–100)
LYMPHOCYTES # BLD: 2.9 K/UL (ref 0.5–4.6)
LYMPHOCYTES NFR BLD: 38 % (ref 13–44)
MCH RBC QN AUTO: 31.3 PG (ref 26.1–32.9)
MCHC RBC AUTO-ENTMCNC: 33.1 G/DL (ref 31.4–35)
MCV RBC AUTO: 94.5 FL (ref 82–102)
MONOCYTES # BLD: 0.5 K/UL (ref 0.1–1.3)
MONOCYTES NFR BLD: 7 % (ref 4–12)
NEUTS SEG # BLD: 4 K/UL (ref 1.7–8.2)
NEUTS SEG NFR BLD: 53 % (ref 43–78)
NRBC # BLD: 0 K/UL (ref 0–0.2)
PLATELET # BLD AUTO: 212 K/UL (ref 150–450)
PMV BLD AUTO: 9.8 FL (ref 9.4–12.3)
POTASSIUM SERPL-SCNC: 4.2 MMOL/L (ref 3.5–5.1)
PROT SERPL-MCNC: 7.2 G/DL (ref 6.3–8.2)
RBC # BLD AUTO: 4.7 M/UL (ref 4.23–5.6)
SODIUM SERPL-SCNC: 141 MMOL/L (ref 136–145)
TRIGL SERPL-MCNC: 133 MG/DL (ref 0–150)
VLDLC SERPL CALC-MCNC: 27 MG/DL (ref 6–23)
WBC # BLD AUTO: 7.6 K/UL (ref 4.3–11.1)

## 2024-11-01 NOTE — PROGRESS NOTES
08/01/2024    VLDL 27 (H) 05/14/2024     Lab Results   Component Value Date    CHOLHDLRATIO 3.6 10/31/2024    CHOLHDLRATIO 4.0 08/01/2024    CHOLHDLRATIO 3.8 05/14/2024       The patient is a 79 y.o. male who is seen for follow-up of hypertension. He is exercising and is not adherent to low salt diet.  Daily caffiene intake: a known amount (2-3 servings/day).   Current medication regimen consists of: Ramipril 10 mg daily + 5 mg q evening.  Blood pressure is not measured at home.    BP Readings from Last 3 Encounters:   11/07/24 120/66   08/05/24 120/70   06/04/24 138/68         Past Medical History:   Diagnosis Date    AK (actinic keratosis)     Cataract     Bilateral    Hyperlipidemia     Hypertension     Hypertensive retinopathy     Kidney stones     x 1    Lumbar spinal stenosis 04/16/2019    Multilevel per MRI    Protruded lumbar disc 04/16/2019    L2-L5 per MRI    Type 2 diabetes mellitus without complication ([facility]) 2002    Vitamin D deficiency 10/18/2018    23.9         Past Surgical History:   Procedure Laterality Date    ANKLE FRACTURE SURGERY Right 03/01/2015    APPENDECTOMY  06/19/2019    TONSILLECTOMY  1959         Social History     Socioeconomic History    Marital status:      Spouse name: None    Number of children: None    Years of education: None    Highest education level: None   Tobacco Use    Smoking status: Never    Smokeless tobacco: Never   Vaping Use    Vaping status: Never Used   Substance and Sexual Activity    Alcohol use: Not Currently    Drug use: Never    Sexual activity: Not Currently     Partners: Female     Social Determinants of Health     Financial Resource Strain: Low Risk  (5/20/2024)    Overall Financial Resource Strain (CARDIA)     Difficulty of Paying Living Expenses: Not very hard   Food Insecurity: No Food Insecurity (5/20/2024)    Hunger Vital Sign     Worried About Running Out of Food in the Last Year: Never true     Ran Out of Food in the Last Year: Never true

## 2024-11-04 SDOH — HEALTH STABILITY: PHYSICAL HEALTH: ON AVERAGE, HOW MANY DAYS PER WEEK DO YOU ENGAGE IN MODERATE TO STRENUOUS EXERCISE (LIKE A BRISK WALK)?: 6 DAYS

## 2024-11-04 SDOH — HEALTH STABILITY: PHYSICAL HEALTH: ON AVERAGE, HOW MANY MINUTES DO YOU ENGAGE IN EXERCISE AT THIS LEVEL?: 30 MIN

## 2024-11-04 ASSESSMENT — PATIENT HEALTH QUESTIONNAIRE - PHQ9
SUM OF ALL RESPONSES TO PHQ9 QUESTIONS 1 & 2: 0
2. FEELING DOWN, DEPRESSED OR HOPELESS: NOT AT ALL
SUM OF ALL RESPONSES TO PHQ QUESTIONS 1-9: 0
1. LITTLE INTEREST OR PLEASURE IN DOING THINGS: NOT AT ALL
SUM OF ALL RESPONSES TO PHQ QUESTIONS 1-9: 0

## 2024-11-04 ASSESSMENT — LIFESTYLE VARIABLES
HOW OFTEN DO YOU HAVE A DRINK CONTAINING ALCOHOL: NEVER
HOW OFTEN DO YOU HAVE A DRINK CONTAINING ALCOHOL: 1
HOW OFTEN DO YOU HAVE SIX OR MORE DRINKS ON ONE OCCASION: 1
HOW MANY STANDARD DRINKS CONTAINING ALCOHOL DO YOU HAVE ON A TYPICAL DAY: PATIENT DOES NOT DRINK
HOW MANY STANDARD DRINKS CONTAINING ALCOHOL DO YOU HAVE ON A TYPICAL DAY: 0

## 2024-11-07 ENCOUNTER — OFFICE VISIT (OUTPATIENT)
Dept: INTERNAL MEDICINE CLINIC | Facility: CLINIC | Age: 79
End: 2024-11-07

## 2024-11-07 VITALS
OXYGEN SATURATION: 97 % | WEIGHT: 227 LBS | SYSTOLIC BLOOD PRESSURE: 120 MMHG | BODY MASS INDEX: 34.4 KG/M2 | HEART RATE: 81 BPM | TEMPERATURE: 97.3 F | HEIGHT: 68 IN | DIASTOLIC BLOOD PRESSURE: 66 MMHG

## 2024-11-07 DIAGNOSIS — N39.43 POST-VOID DRIBBLING: ICD-10-CM

## 2024-11-07 DIAGNOSIS — Z12.5 SCREENING FOR PROSTATE CANCER: ICD-10-CM

## 2024-11-07 DIAGNOSIS — Z12.11 SCREENING FOR COLON CANCER: ICD-10-CM

## 2024-11-07 DIAGNOSIS — Z23 NEED FOR INFLUENZA VACCINATION: ICD-10-CM

## 2024-11-07 DIAGNOSIS — I10 ESSENTIAL HYPERTENSION: ICD-10-CM

## 2024-11-07 DIAGNOSIS — E78.5 HYPERLIPIDEMIA LDL GOAL <100: ICD-10-CM

## 2024-11-07 DIAGNOSIS — E11.42 TYPE 2 DIABETES MELLITUS WITH DIABETIC POLYNEUROPATHY, WITH LONG-TERM CURRENT USE OF INSULIN (HCC): ICD-10-CM

## 2024-11-07 DIAGNOSIS — Z00.00 MEDICARE ANNUAL WELLNESS VISIT, SUBSEQUENT: Primary | ICD-10-CM

## 2024-11-07 DIAGNOSIS — Z79.4 TYPE 2 DIABETES MELLITUS WITH DIABETIC POLYNEUROPATHY, WITH LONG-TERM CURRENT USE OF INSULIN (HCC): ICD-10-CM

## 2024-11-07 LAB — PSA SERPL-MCNC: 2 NG/ML (ref 0–4)

## 2024-11-07 RX ORDER — RAMIPRIL 5 MG/1
5 CAPSULE ORAL EVERY EVENING
Qty: 90 CAPSULE | Refills: 3 | Status: SHIPPED | OUTPATIENT
Start: 2024-11-07

## 2024-11-07 RX ORDER — ATORVASTATIN CALCIUM 20 MG/1
20 TABLET, FILM COATED ORAL NIGHTLY
Qty: 90 TABLET | Refills: 3 | Status: SHIPPED | OUTPATIENT
Start: 2024-11-07

## 2024-11-07 RX ORDER — INSULIN GLARGINE AND LIXISENATIDE 100; 33 U/ML; UG/ML
40 INJECTION, SOLUTION SUBCUTANEOUS EVERY MORNING
Qty: 12 ML | Refills: 5 | Status: CANCELLED | OUTPATIENT
Start: 2024-11-07

## 2024-11-07 RX ORDER — EMPAGLIFLOZIN 25 MG/1
25 TABLET, FILM COATED ORAL DAILY
Qty: 28 TABLET | Refills: 0 | Status: SHIPPED | COMMUNITY
Start: 2024-11-07

## 2024-11-07 RX ORDER — RAMIPRIL 10 MG/1
10 CAPSULE ORAL EVERY MORNING
Qty: 90 CAPSULE | Refills: 3 | Status: SHIPPED | OUTPATIENT
Start: 2024-11-07

## 2024-11-08 LAB
HEMOCCULT STL QL: NEGATIVE
VALID INTERNAL CONTROL: YES

## 2024-11-08 NOTE — RESULT ENCOUNTER NOTE
PSA (prostate screening lab) is normal.  Also I forgot to mention to him that I went ahead and did a stool test for blood to screen for colon cancer since there was some residual stool on the glove from prostate exam - it was negative also.

## 2024-12-24 DIAGNOSIS — E11.65 UNCONTROLLED TYPE 2 DIABETES MELLITUS WITH HYPERGLYCEMIA (HCC): ICD-10-CM

## 2024-12-24 RX ORDER — FLURBIPROFEN SODIUM 0.3 MG/ML
SOLUTION/ DROPS OPHTHALMIC
Qty: 100 EACH | Refills: 3 | OUTPATIENT
Start: 2024-12-24

## 2025-01-20 DIAGNOSIS — E11.65 UNCONTROLLED TYPE 2 DIABETES MELLITUS WITH HYPERGLYCEMIA (HCC): ICD-10-CM

## 2025-01-27 DIAGNOSIS — I10 ESSENTIAL HYPERTENSION: ICD-10-CM

## 2025-01-27 RX ORDER — RAMIPRIL 10 MG/1
10 CAPSULE ORAL EVERY MORNING
Qty: 90 CAPSULE | Refills: 3 | OUTPATIENT
Start: 2025-01-27

## 2025-02-26 DIAGNOSIS — Z79.4 TYPE 2 DIABETES MELLITUS WITH DIABETIC POLYNEUROPATHY, WITH LONG-TERM CURRENT USE OF INSULIN (HCC): ICD-10-CM

## 2025-02-26 DIAGNOSIS — E11.42 TYPE 2 DIABETES MELLITUS WITH DIABETIC POLYNEUROPATHY, WITH LONG-TERM CURRENT USE OF INSULIN (HCC): ICD-10-CM

## 2025-02-26 RX ORDER — INSULIN GLARGINE AND LIXISENATIDE 100; 33 U/ML; UG/ML
INJECTION, SOLUTION SUBCUTANEOUS
Qty: 15 ML | Refills: 5 | OUTPATIENT
Start: 2025-02-26

## 2025-02-26 NOTE — TELEPHONE ENCOUNTER
Please confirm with patient what dose of Soliqua he has worked up to?  Per our last visit we were having him titrate up by 2 units every 2 weeks so not sure where he landed on dosing.

## 2025-02-27 DIAGNOSIS — E11.42 TYPE 2 DIABETES MELLITUS WITH DIABETIC POLYNEUROPATHY, WITH LONG-TERM CURRENT USE OF INSULIN (HCC): ICD-10-CM

## 2025-02-27 DIAGNOSIS — Z79.4 TYPE 2 DIABETES MELLITUS WITH DIABETIC POLYNEUROPATHY, WITH LONG-TERM CURRENT USE OF INSULIN (HCC): ICD-10-CM

## 2025-02-27 RX ORDER — INSULIN GLARGINE AND LIXISENATIDE 100; 33 U/ML; UG/ML
48 INJECTION, SOLUTION SUBCUTANEOUS EVERY MORNING
Qty: 15 ML | Refills: 5 | Status: SHIPPED | OUTPATIENT
Start: 2025-02-27

## 2025-02-28 RX ORDER — INSULIN GLARGINE AND LIXISENATIDE 100; 33 U/ML; UG/ML
INJECTION, SOLUTION SUBCUTANEOUS
Qty: 15 ML | Refills: 5 | OUTPATIENT
Start: 2025-02-28

## 2025-04-04 ENCOUNTER — LAB (OUTPATIENT)
Dept: INTERNAL MEDICINE CLINIC | Facility: CLINIC | Age: 80
End: 2025-04-04

## 2025-04-04 DIAGNOSIS — Z79.4 TYPE 2 DIABETES MELLITUS WITH DIABETIC POLYNEUROPATHY, WITH LONG-TERM CURRENT USE OF INSULIN (HCC): ICD-10-CM

## 2025-04-04 DIAGNOSIS — E11.42 TYPE 2 DIABETES MELLITUS WITH DIABETIC POLYNEUROPATHY, WITH LONG-TERM CURRENT USE OF INSULIN (HCC): ICD-10-CM

## 2025-04-04 DIAGNOSIS — E78.5 HYPERLIPIDEMIA LDL GOAL <100: ICD-10-CM

## 2025-04-04 DIAGNOSIS — I10 ESSENTIAL HYPERTENSION: ICD-10-CM

## 2025-04-04 LAB
ALBUMIN SERPL-MCNC: 3.8 G/DL (ref 3.2–4.6)
ALBUMIN/GLOB SERPL: 1.1 (ref 1–1.9)
ALP SERPL-CCNC: 69 U/L (ref 40–129)
ALT SERPL-CCNC: 43 U/L (ref 8–55)
ANION GAP SERPL CALC-SCNC: 11 MMOL/L (ref 7–16)
AST SERPL-CCNC: 34 U/L (ref 15–37)
BILIRUB SERPL-MCNC: 0.4 MG/DL (ref 0–1.2)
BUN SERPL-MCNC: 22 MG/DL (ref 8–23)
CALCIUM SERPL-MCNC: 9.5 MG/DL (ref 8.8–10.2)
CHLORIDE SERPL-SCNC: 107 MMOL/L (ref 98–107)
CHOLEST SERPL-MCNC: 133 MG/DL (ref 0–200)
CO2 SERPL-SCNC: 23 MMOL/L (ref 20–29)
CREAT SERPL-MCNC: 1.06 MG/DL (ref 0.8–1.3)
CREAT UR-MCNC: 84.6 MG/DL (ref 39–259)
EST. AVERAGE GLUCOSE BLD GHB EST-MCNC: 189 MG/DL
GLOBULIN SER CALC-MCNC: 3.4 G/DL (ref 2.3–3.5)
GLUCOSE SERPL-MCNC: 137 MG/DL (ref 70–99)
HBA1C MFR BLD: 8.2 % (ref 0–5.6)
HDLC SERPL-MCNC: 33 MG/DL (ref 40–60)
HDLC SERPL: 4.1 (ref 0–5)
LDLC SERPL CALC-MCNC: 73 MG/DL (ref 0–100)
MICROALBUMIN UR-MCNC: <1.2 MG/DL (ref 0–20)
MICROALBUMIN/CREAT UR-RTO: NORMAL MG/G (ref 0–30)
POTASSIUM SERPL-SCNC: 4.4 MMOL/L (ref 3.5–5.1)
PROT SERPL-MCNC: 7.2 G/DL (ref 6.3–8.2)
SODIUM SERPL-SCNC: 141 MMOL/L (ref 136–145)
TRIGL SERPL-MCNC: 138 MG/DL (ref 0–150)
VLDLC SERPL CALC-MCNC: 28 MG/DL (ref 6–23)

## 2025-04-04 NOTE — PROGRESS NOTES
Albaro Rob (:  1945) is a 79 y.o. male,Established patient, here for evaluation of the following chief complaint(s):  Diabetes (3 month diabetes f/u with lab review. ), Hypertension, and Hyperlipidemia          Assessment/Plan  1. 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 Acute pain of left shoulder  -     XR SHOULDER LEFT (MIN 2 VIEWS); Future  -     [facility], [facility]  3. Degeneration of intervertebral disc of lumbar region with discogenic back pain  -     [facility], [facility]  4. Weakness of both lower extremities  -     [facility], [facility]  5. Hyperlipidemia LDL goal <100  -     Comprehensive Metabolic Panel; Future  -     Lipid Panel; Future  6. Essential hypertension  -     CBC with Auto Differential; Future  -     Comprehensive Metabolic Panel; Future  7. Central obesity         Patient Instructions   Trial increase of Metformin ER to twice daily with meals - suggest taking Prandin 30 minutes before breakfast and waiting until end of meal when stomach is full to take Metformin  Second option if above produces diarrhea would be to try taking Metformin ER 1000 mg (2 x 500 mg tablets) with dinner and keep Prandin with breakfast   Encouraged more consistent use of Prandin with breakfast  Recommend optimizing diet - doing everything he can and knows to do to control his dietary factors with goals of reaching optimal glycemic control  Advised to broaden exercise efforts to include different types of cardiovascular activities like water aerobics, elliptical machines, bicycling etc with

## 2025-04-05 SDOH — ECONOMIC STABILITY: INCOME INSECURITY: IN THE LAST 12 MONTHS, WAS THERE A TIME WHEN YOU WERE NOT ABLE TO PAY THE MORTGAGE OR RENT ON TIME?: NO

## 2025-04-05 SDOH — ECONOMIC STABILITY: FOOD INSECURITY: WITHIN THE PAST 12 MONTHS, YOU WORRIED THAT YOUR FOOD WOULD RUN OUT BEFORE YOU GOT MONEY TO BUY MORE.: NEVER TRUE

## 2025-04-05 SDOH — ECONOMIC STABILITY: FOOD INSECURITY: WITHIN THE PAST 12 MONTHS, THE FOOD YOU BOUGHT JUST DIDN'T LAST AND YOU DIDN'T HAVE MONEY TO GET MORE.: NEVER TRUE

## 2025-04-05 SDOH — ECONOMIC STABILITY: TRANSPORTATION INSECURITY
IN THE PAST 12 MONTHS, HAS THE LACK OF TRANSPORTATION KEPT YOU FROM MEDICAL APPOINTMENTS OR FROM GETTING MEDICATIONS?: NO

## 2025-04-05 ASSESSMENT — PATIENT HEALTH QUESTIONNAIRE - PHQ9
SUM OF ALL RESPONSES TO PHQ QUESTIONS 1-9: 0
SUM OF ALL RESPONSES TO PHQ QUESTIONS 1-9: 0
2. FEELING DOWN, DEPRESSED OR HOPELESS: NOT AT ALL
SUM OF ALL RESPONSES TO PHQ QUESTIONS 1-9: 0
1. LITTLE INTEREST OR PLEASURE IN DOING THINGS: NOT AT ALL
SUM OF ALL RESPONSES TO PHQ9 QUESTIONS 1 & 2: 0
1. LITTLE INTEREST OR PLEASURE IN DOING THINGS: NOT AT ALL
2. FEELING DOWN, DEPRESSED OR HOPELESS: NOT AT ALL
SUM OF ALL RESPONSES TO PHQ QUESTIONS 1-9: 0

## 2025-04-06 ENCOUNTER — RESULTS FOLLOW-UP (OUTPATIENT)
Dept: INTERNAL MEDICINE CLINIC | Facility: CLINIC | Age: 80
End: 2025-04-06

## 2025-04-07 ENCOUNTER — OFFICE VISIT (OUTPATIENT)
Dept: INTERNAL MEDICINE CLINIC | Facility: CLINIC | Age: 80
End: 2025-04-07
Payer: MEDICARE

## 2025-04-07 ENCOUNTER — HOSPITAL ENCOUNTER (OUTPATIENT)
Dept: GENERAL RADIOLOGY | Age: 80
Discharge: HOME | End: 2025-04-10
Payer: MEDICARE

## 2025-04-07 ENCOUNTER — RESULTS FOLLOW-UP (OUTPATIENT)
Dept: INTERNAL MEDICINE CLINIC | Facility: CLINIC | Age: 80
End: 2025-04-07

## 2025-04-07 VITALS
DIASTOLIC BLOOD PRESSURE: 76 MMHG | BODY MASS INDEX: 34.56 KG/M2 | OXYGEN SATURATION: 95 % | TEMPERATURE: 97.3 F | WEIGHT: 228 LBS | HEART RATE: 84 BPM | HEIGHT: 68 IN | SYSTOLIC BLOOD PRESSURE: 134 MMHG

## 2025-04-07 DIAGNOSIS — M25.512 ACUTE PAIN OF LEFT SHOULDER: ICD-10-CM

## 2025-04-07 DIAGNOSIS — M51.360 DEGENERATION OF INTERVERTEBRAL DISC OF LUMBAR REGION WITH DISCOGENIC BACK PAIN: ICD-10-CM

## 2025-04-07 DIAGNOSIS — E11.41 TYPE 2 DIABETES MELLITUS WITH DIABETIC MONONEUROPATHY, WITH LONG-TERM CURRENT USE OF INSULIN (HCC): Primary | ICD-10-CM

## 2025-04-07 DIAGNOSIS — R29.898 WEAKNESS OF BOTH LOWER EXTREMITIES: ICD-10-CM

## 2025-04-07 DIAGNOSIS — E78.5 HYPERLIPIDEMIA LDL GOAL <100: ICD-10-CM

## 2025-04-07 DIAGNOSIS — I10 ESSENTIAL HYPERTENSION: ICD-10-CM

## 2025-04-07 DIAGNOSIS — Z79.4 TYPE 2 DIABETES MELLITUS WITH DIABETIC MONONEUROPATHY, WITH LONG-TERM CURRENT USE OF INSULIN (HCC): Primary | ICD-10-CM

## 2025-04-07 DIAGNOSIS — E65 CENTRAL OBESITY: ICD-10-CM

## 2025-04-07 PROCEDURE — G2211 COMPLEX E/M VISIT ADD ON: HCPCS | Performed by: PHYSICIAN ASSISTANT

## 2025-04-07 PROCEDURE — 73030 X-RAY EXAM OF SHOULDER: CPT

## 2025-04-07 PROCEDURE — 99215 OFFICE O/P EST HI 40 MIN: CPT | Performed by: PHYSICIAN ASSISTANT

## 2025-04-07 PROCEDURE — 1159F MED LIST DOCD IN RCRD: CPT | Performed by: PHYSICIAN ASSISTANT

## 2025-04-07 PROCEDURE — 3075F SYST BP GE 130 - 139MM HG: CPT | Performed by: PHYSICIAN ASSISTANT

## 2025-04-07 PROCEDURE — 1125F AMNT PAIN NOTED PAIN PRSNT: CPT | Performed by: PHYSICIAN ASSISTANT

## 2025-04-07 PROCEDURE — 1123F ACP DISCUSS/DSCN MKR DOCD: CPT | Performed by: PHYSICIAN ASSISTANT

## 2025-04-07 PROCEDURE — 3078F DIAST BP <80 MM HG: CPT | Performed by: PHYSICIAN ASSISTANT

## 2025-04-07 PROCEDURE — 3052F HG A1C>EQUAL 8.0%<EQUAL 9.0%: CPT | Performed by: PHYSICIAN ASSISTANT

## 2025-04-07 RX ORDER — METFORMIN HYDROCHLORIDE 500 MG/1
500 TABLET, EXTENDED RELEASE ORAL 2 TIMES DAILY WITH MEALS
Qty: 180 TABLET | Refills: 3 | Status: SHIPPED | OUTPATIENT
Start: 2025-04-07

## 2025-04-07 NOTE — PATIENT INSTRUCTIONS
Trial increase of Metformin ER to twice daily with meals - suggest taking Prandin 30 minutes before breakfast and waiting until end of meal when stomach is full to take Metformin  Second option if above produces diarrhea would be to try taking Metformin ER 1000 mg (2 x 500 mg tablets) with dinner and keep Prandin with breakfast   Encouraged more consistent use of Prandin with breakfast  Recommend optimizing diet - doing everything he can and knows to do to control his dietary factors with goals of reaching optimal glycemic control  Advised to broaden exercise efforts to include different types of cardiovascular activities like water aerobics, elliptical machines, bicycling etc with incorporation of weight lifting to increase physical strength once he joins a gym  Discussed next steps in diabetes management being increasing insulin as needed or adding low dose of Actos to directly manage the insulin resistance that is driving so much of his glycemic struggles  Monitor blood sugar 3 times/day (fasting, pre-dinner & 2 hours after dinner) and keep record to bring to next appointment  Continue chronic medications as prescribed  Reminded to stay well hydrated with plenty of water  Monitor blood pressure 2-4 times/month and keep record to bring to next appointment

## 2025-04-08 NOTE — RESULT ENCOUNTER NOTE
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  Recommended treatment includes anti-inflammatories (Aleve or Ibuprofen) and physical therapy.  Injections can be considered if the conservative treatment isn't working but we'll start with physical therapy as discussed.

## 2025-04-24 ENCOUNTER — HOSPITAL ENCOUNTER (OUTPATIENT)
Dept: PHYSICAL THERAPY | Age: 80
Setting detail: RECURRING SERIES
Discharge: HOME | End: 2025-04-27
Payer: MEDICARE

## 2025-04-24 DIAGNOSIS — M54.59 OTHER LOW BACK PAIN: ICD-10-CM

## 2025-04-24 DIAGNOSIS — M62.81 MUSCLE WEAKNESS (GENERALIZED): Primary | ICD-10-CM

## 2025-04-24 DIAGNOSIS — M25.512 LEFT SHOULDER PAIN, UNSPECIFIED CHRONICITY: ICD-10-CM

## 2025-04-24 PROCEDURE — 97110 THERAPEUTIC EXERCISES: CPT

## 2025-04-24 PROCEDURE — 97162 PT EVAL MOD COMPLEX 30 MIN: CPT

## 2025-04-24 ASSESSMENT — PAIN SCALES - GENERAL: PAINLEVEL_OUTOF10: 0

## 2025-04-24 NOTE — THERAPY EVALUATION
Albaro Rob  : 1945  Primary: [employer] (Medicare Managed)  Secondary:  [facility] [place]  [place] [address]  Phone: [phone]  Fax: [phone] Plan Frequency: 2x/week  Plan of Care/Certification Expiration Date: 25        Plan of Care/Certification Expiration Date:  Plan of Care/Certification Expiration Date: 25    Frequency/Duration: Plan Frequency: 2x/week      Time In/Out:   Time In: 1000  Time Out: 1100      PT Visit Info:         Visit Count:  1                OUTPATIENT PHYSICAL THERAPY:             Initial Assessment 2025               Episode (LE weakness, left shoulder pain, balance)         Treatment Diagnosis:     Muscle weakness (generalized)  Left shoulder pain, unspecified chronicity  Other low back pain  Medical/Referring Diagnosis:    Acute pain of left shoulder [M25.512]  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  Restrictions/Precautions:    None      Medications Last Reviewed: 2025     SUBJECTIVE   History of Injury/Illness (Reason for Referral):  Truong reports difficulty with some activities due to decreased strength in his LE's, difficulty with balance, back pain, and left shoulder pain.  He reports a long history of low back pain that almost resulted in surgery 2 years ago, but eventually resolved.  He reports DDD, disc bulges, and stenosis were diagnosed at that time.  He reports difficulty with back mobility since that time resulting in using AD for donning shoes, difficulty with standing > 10-15 minutes,  and picking up objects from the ground.  He reports noting decreased strength in his

## 2025-04-24 NOTE — PROGRESS NOTES
Albaro Truong Darron  : 1945  Primary: [employer] (Medicare Managed)  Secondary:  [facility] [place]  [place] [address]  Phone: [phone]  Fax: [phone] Plan Frequency: 2x/week    Plan of Care/Certification Expiration Date: 25        Plan of Care/Certification Expiration Date:  Plan of Care/Certification Expiration Date: 25    Frequency/Duration: Plan Frequency: 2x/week      Time In/Out:   Time In: 1000  Time Out: 1100      PT Visit Info:         Visit Count:  1    OUTPATIENT PHYSICAL THERAPY:   Treatment Note 2025       Episode  (LE weakness, left shoulder pain, balance)               Treatment Diagnosis:    Muscle weakness (generalized)  Left shoulder pain, unspecified chronicity  Other low back pain  Medical/Referring Diagnosis:    Acute pain of left shoulder [M25.512]  Degeneration of intervertebral disc of lumbar region with discogenic back pain [M51.360]  Weakness of both lower extremities [R29.898]      Referring Physician:  Mattie Shaffer PA-C MD Orders:  PT Eval and Treat   Return MD Appt:  unknown    Date of Onset:  Onset Date: 22     Allergies:   Patient has no known allergies.  Restrictions/Precautions:   None      Interventions Planned (Treatment may consist of any combination of the following):     See Assessment Note    Subjective Comments:   Pt reports difficulty with some tasks and pain with others.   Initial Pain Level:     0/10  Post Session Pain Level:      0/10  Medications Last Reviewed: 2025  Updated Objective Findings:  See Evaluation Note from today  Treatment   THERAPEUTIC EXERCISE: (15 minutes):    Exercises per grid below to improve mobility, strength, and balance.  Required minimal verbal cues to promote proper body mechanics.  Progressed resistance, range, repetitions, and complexity of movement as indicated.     2025   Activity/Exercise Parameters                 Patient

## 2025-04-29 ENCOUNTER — HOSPITAL ENCOUNTER (OUTPATIENT)
Dept: PHYSICAL THERAPY | Age: 80
Setting detail: RECURRING SERIES
Discharge: HOME | End: 2025-05-02
Payer: MEDICARE

## 2025-04-29 PROCEDURE — 97110 THERAPEUTIC EXERCISES: CPT

## 2025-04-29 ASSESSMENT — PAIN SCALES - GENERAL: PAINLEVEL_OUTOF10: 0

## 2025-04-29 NOTE — PROGRESS NOTES
Albaro Guerin Darron  : 1945  Primary: [employer] (Medicare Managed)  Secondary:  [facility] [place]  [place] [address]  Phone: [phone]  Fax: [phone] Plan Frequency: 2x/week    Plan of Care/Certification Expiration Date: 25        Plan of Care/Certification Expiration Date:  Plan of Care/Certification Expiration Date: 25    Frequency/Duration: Plan Frequency: 2x/week      Time In/Out:   Time In: 0800  Time Out: 0900      PT Visit Info:         Visit Count:  2    OUTPATIENT PHYSICAL THERAPY:   Treatment Note 2025       Episode  (LE weakness, left shoulder pain, balance)               Treatment Diagnosis:    No data found  Muscle weakness (generalized)  Left shoulder pain, unspecified chronicity  Other low back pain  Medical/Referring Diagnosis:    Acute pain of left shoulder [M25.512]  Degeneration of intervertebral disc of lumbar region with discogenic back pain [M51.360]  Weakness of both lower extremities [R29.898]  Referring Physician:  Mattie Shaffer PA-C MD Orders:  PT Eval and Treat   Return MD Appt:  unknown    Date of Onset:  Onset Date: 22     Allergies:   Patient has no known allergies.  Restrictions/Precautions:   None      Interventions Planned (Treatment may consist of any combination of the following):     See Assessment Note    Subjective Comments:   Pt reports that he had some cramping in his left inner thigh with his rotation exercise at home but did well otherwise.   Initial Pain Level:     0/10  Post Session Pain Level:      0/10  Medications Last Reviewed: 2025  Updated Objective Findings:  None Today  Treatment   THERAPEUTIC EXERCISE: (55 minutes):    Exercises per grid below to improve mobility, strength, and balance.  Required minimal verbal cues to promote proper body mechanics.  Progressed resistance, range, repetitions, and complexity of movement as indicated.     2025

## 2025-05-01 ENCOUNTER — HOSPITAL ENCOUNTER (OUTPATIENT)
Dept: PHYSICAL THERAPY | Age: 80
Setting detail: RECURRING SERIES
Discharge: HOME | End: 2025-05-04
Payer: MEDICARE

## 2025-05-01 PROCEDURE — 97110 THERAPEUTIC EXERCISES: CPT

## 2025-05-01 ASSESSMENT — PAIN SCALES - GENERAL: PAINLEVEL_OUTOF10: 0

## 2025-05-20 ENCOUNTER — HOSPITAL ENCOUNTER (OUTPATIENT)
Dept: PHYSICAL THERAPY | Age: 80
Setting detail: RECURRING SERIES
Discharge: HOME | End: 2025-05-23
Payer: MEDICARE

## 2025-05-20 PROCEDURE — 97110 THERAPEUTIC EXERCISES: CPT

## 2025-05-20 ASSESSMENT — PAIN SCALES - GENERAL: PAINLEVEL_OUTOF10: 0

## 2025-05-22 NOTE — PROGRESS NOTES
Albaro Rob (:  1945) is a 79 y.o. male,Established patient, here for evaluation of the following chief complaint(s):  Diabetes (6 week diabetes f/u.), Hypertension, and Hyperlipidemia          Assessment/Plan  1. Type 2 diabetes mellitus with diabetic polyneuropathy, with long-term current use of insulin (HCC)  The following orders have not been finalized:  -     Insulin Glargine-Lixisenatide (SOLIQUA) 100-33 UNT-MCG/ML SOPN  -     empagliflozin (JARDIANCE) 25 MG tablet  2. Uncontrolled type 2 diabetes mellitus with hyperglycemia (HCC)  The following orders have not been finalized:  -     repaglinide (PRANDIN) 0.5 MG tablet         There are no Patient Instructions on file for this visit.      Return in about 6 weeks (around 2025) for MWE + diabetes f/u w/ fasting labs 3-7 days prior.      Subjective   HPI  The patient is a 79 y.o. male who is seen for follow up of diabetes.  Current monitoring regimen: BGs are showing improvement consistently.  Glucose monitoring frequency: 1-2 times daily  Home blood sugar records: fasting range: 115-160s with occasional spike to 180-190s, pre-dinner range: 100-140s  Any episodes of hypoglycemia? no  Known diabetic complications: peripheral neuropathy  Current diabetic medications include: oral agents (triple therapy): Glucophage  mg (1/2 of 500 mg tablet) q AM + 500 mg q evening with dinner, repaglinide (Prandin) 0.5 mg every morning prior to breakfast +/- evening prior to dinner (only takes when he is eating out or having a high carb meal), Jardiance 25 mg daily with breakfast ~ 11 am and insulin injections: Soliqua 48 units q AM upon awakening.       Last HbA1C:    Lab Results   Component Value Date    LABA1C 8.2 (H) 2025     Lab Results   Component Value Date     2025           Last Lipid Panel:   Lab Results   Component Value Date    CHOL 133 2025    CHOL 140 10/31/2024    CHOL 133 2024     Lab Results

## 2025-05-23 ENCOUNTER — HOSPITAL ENCOUNTER (OUTPATIENT)
Dept: PHYSICAL THERAPY | Age: 80
Setting detail: RECURRING SERIES
Discharge: HOME | End: 2025-05-26
Payer: MEDICARE

## 2025-05-23 PROCEDURE — 97110 THERAPEUTIC EXERCISES: CPT

## 2025-05-23 ASSESSMENT — PAIN SCALES - GENERAL: PAINLEVEL_OUTOF10: 0

## 2025-05-23 NOTE — PROGRESS NOTES
Albaro Guerin Darron  : 1945  Primary: [employer] (Medicare Managed)  Secondary:  [facility] [place]  [place] [address]  Phone: [phone]  Fax: [phone] Plan Frequency: 2x/week    Plan of Care/Certification Expiration Date: 25        Plan of Care/Certification Expiration Date:  Plan of Care/Certification Expiration Date: 25    Frequency/Duration: Plan Frequency: 2x/week      Time In/Out:   Time In: 1000  Time Out: 1100      PT Visit Info:         Visit Count:  5    OUTPATIENT PHYSICAL THERAPY:   Treatment Note 2025       Episode  (LE weakness, left shoulder pain, balance)               Treatment Diagnosis:    No data found  Muscle weakness (generalized)  Left shoulder pain, unspecified chronicity  Other low back pain  Medical/Referring Diagnosis:    Acute pain of left shoulder [M25.512]  Degeneration of intervertebral disc of lumbar region with discogenic back pain [M51.360]  Weakness of both lower extremities [R29.898]  Referring Physician:  Mattie Shaffer PA-C MD Orders:  PT Eval and Treat   Return MD Appt:  unknown    Date of Onset:  Onset Date: 22     Allergies:   Patient has no known allergies.  Restrictions/Precautions:   None      Interventions Planned (Treatment may consist of any combination of the following):     See Assessment Note    Subjective Comments:   Pt reports that his quads were a little sore after his last treatment.     Initial Pain Level:     0/10  Post Session Pain Level:      0/10  Medications Last Reviewed: 2025  Updated Objective Findings:  None Today  Treatment   THERAPEUTIC EXERCISE: (55 minutes):    Exercises per grid below to improve mobility, strength, and balance.  Required minimal verbal cues to promote proper body mechanics.  Progressed resistance, range, repetitions, and complexity of movement as indicated.     2025   Activity/Exercise Parameters                 Patient

## 2025-05-27 ENCOUNTER — OFFICE VISIT (OUTPATIENT)
Dept: INTERNAL MEDICINE CLINIC | Facility: CLINIC | Age: 80
End: 2025-05-27
Payer: MEDICARE

## 2025-05-27 VITALS
DIASTOLIC BLOOD PRESSURE: 70 MMHG | HEIGHT: 68 IN | SYSTOLIC BLOOD PRESSURE: 124 MMHG | OXYGEN SATURATION: 98 % | TEMPERATURE: 97.3 F | WEIGHT: 231 LBS | HEART RATE: 75 BPM | BODY MASS INDEX: 35.01 KG/M2

## 2025-05-27 DIAGNOSIS — Z79.4 TYPE 2 DIABETES MELLITUS WITH DIABETIC POLYNEUROPATHY, WITH LONG-TERM CURRENT USE OF INSULIN (HCC): Primary | ICD-10-CM

## 2025-05-27 DIAGNOSIS — E11.42 TYPE 2 DIABETES MELLITUS WITH DIABETIC POLYNEUROPATHY, WITH LONG-TERM CURRENT USE OF INSULIN (HCC): Primary | ICD-10-CM

## 2025-05-27 PROCEDURE — 3074F SYST BP LT 130 MM HG: CPT | Performed by: PHYSICIAN ASSISTANT

## 2025-05-27 PROCEDURE — 1123F ACP DISCUSS/DSCN MKR DOCD: CPT | Performed by: PHYSICIAN ASSISTANT

## 2025-05-27 PROCEDURE — 1126F AMNT PAIN NOTED NONE PRSNT: CPT | Performed by: PHYSICIAN ASSISTANT

## 2025-05-27 PROCEDURE — G2211 COMPLEX E/M VISIT ADD ON: HCPCS | Performed by: PHYSICIAN ASSISTANT

## 2025-05-27 PROCEDURE — 1159F MED LIST DOCD IN RCRD: CPT | Performed by: PHYSICIAN ASSISTANT

## 2025-05-27 PROCEDURE — 3052F HG A1C>EQUAL 8.0%<EQUAL 9.0%: CPT | Performed by: PHYSICIAN ASSISTANT

## 2025-05-27 PROCEDURE — 99213 OFFICE O/P EST LOW 20 MIN: CPT | Performed by: PHYSICIAN ASSISTANT

## 2025-05-27 PROCEDURE — 3078F DIAST BP <80 MM HG: CPT | Performed by: PHYSICIAN ASSISTANT

## 2025-05-27 RX ORDER — EMPAGLIFLOZIN 25 MG/1
25 TABLET, FILM COATED ORAL DAILY
Qty: 14 TABLET | Refills: 0 | Status: SHIPPED | COMMUNITY
Start: 2025-05-27

## 2025-05-27 RX ORDER — REPAGLINIDE 0.5 MG/1
TABLET ORAL
Qty: 360 TABLET | Refills: 3 | Status: SHIPPED | OUTPATIENT
Start: 2025-05-27

## 2025-05-27 RX ORDER — INSULIN GLARGINE AND LIXISENATIDE 100; 33 U/ML; UG/ML
48 INJECTION, SOLUTION SUBCUTANEOUS EVERY MORNING
Qty: 15 ML | Refills: 5 | Status: CANCELLED | OUTPATIENT
Start: 2025-05-27

## 2025-06-02 NOTE — PROGRESS NOTES
Albaro Guerin Darron  : 1945  Primary: [employer] (Medicare Managed)  Secondary:  [facility] [place]  [place] [address]  Phone: [phone]  Fax: [phone] Plan Frequency: 2x/week    Plan of Care/Certification Expiration Date: 25        Plan of Care/Certification Expiration Date:  Plan of Care/Certification Expiration Date: 25    Frequency/Duration: Plan Frequency: 2x/week      Time In/Out:   Time In: 1000  Time Out: 1100      PT Visit Info:         Visit Count:  6    OUTPATIENT PHYSICAL THERAPY:   Treatment Note 6/3/2025       Episode  (LE weakness, left shoulder pain, balance)               Treatment Diagnosis:    No data found  Muscle weakness (generalized)  Left shoulder pain, unspecified chronicity  Other low back pain  Medical/Referring Diagnosis:    Acute pain of left shoulder [M25.512]  Degeneration of intervertebral disc of lumbar region with discogenic back pain [M51.360]  Weakness of both lower extremities [R29.898]  Referring Physician:  Mattie Shaffer PA-C MD Orders:  PT Eval and Treat   Return MD Appt:  unknown    Date of Onset:  Onset Date: 22     Allergies:   Patient has no known allergies.  Restrictions/Precautions:   None      Interventions Planned (Treatment may consist of any combination of the following):     See Assessment Note    Subjective Comments:   Pt reports that he gets a little nervous with some of his exercises because it makes him think he is going to aggravate his low back.  He also reports that his left shoulder continues to bother him with reaching back.     Initial Pain Level:     0/10  Post Session Pain Level:      0/10  Medications Last Reviewed: 6/3/2025  Updated Objective Findings:  None Today  Treatment   THERAPEUTIC EXERCISE: (55 minutes):    Exercises per grid below to improve mobility, strength, and balance.  Required minimal verbal cues to promote proper body mechanics.  Progressed

## 2025-06-03 ENCOUNTER — HOSPITAL ENCOUNTER (OUTPATIENT)
Dept: PHYSICAL THERAPY | Age: 80
Setting detail: RECURRING SERIES
Discharge: HOME | End: 2025-06-06
Payer: MEDICARE

## 2025-06-03 PROCEDURE — 97110 THERAPEUTIC EXERCISES: CPT

## 2025-06-03 ASSESSMENT — PAIN SCALES - GENERAL: PAINLEVEL_OUTOF10: 0

## 2025-06-05 ENCOUNTER — HOSPITAL ENCOUNTER (OUTPATIENT)
Dept: PHYSICAL THERAPY | Age: 80
Setting detail: RECURRING SERIES
Discharge: HOME | End: 2025-06-08
Payer: MEDICARE

## 2025-06-05 PROCEDURE — 97110 THERAPEUTIC EXERCISES: CPT

## 2025-06-05 ASSESSMENT — PAIN SCALES - GENERAL: PAINLEVEL_OUTOF10: 0

## 2025-06-05 NOTE — PROGRESS NOTES
education    Bike 5 minutes  (nustep today)    KTC  3 x 10 sec    LE rocking     Ankle TB  Dorsiflexion with red TB: 3 x 10 each, each side    Posterior pelvic tilt  10 reps    Hip flexor stretch off edge     SLR     Bridge  20 reg, 20 with marching    SLR abduction     clamshells 3 x 15 each side    Adductor stretch     Sit to stand  2x12 reps   Shuttle  DLS with 6 cords, SLS with 5 cords (additional pillow), 3 x 10 each    Tandem walking 2x30 ft   Tandem balance     Taps to step     Standing on foam    Standing hip abduction     Multifidus walk outs     Step ups     Wall crashes     Balance board  Static a/p 2 x 1 min, dynamic a/p 1 min, dynamic a/p with bias to dorsiflexors 2 x 1 min        Rows  Green cords on blue foam: 3 x 10    Punches     ER resisted  Red cord: 3 x 10 on left only    March down donovan 2x30 ft   Shoulder ext 20 reps green         Treatment/Session Summary:    Treatment Assessment:   Pt demonstrates some continued LE strength deficits and difficulty with dynamic balance and proprioception.      Communication/Consultation:  None today  Equipment provided today:  None  Recommendations/Intent for next treatment session: Next visit will focus on progression of mobility, strength, and balance.    >Total Treatment Billable Duration:  55 minutes   Time In: 1430  Time Out: 1530     Bri Ramos, PT         Charge Capture  Events  [employer] Portal  Appt Desk  Attendance Report     Future Appointments   Date Time Provider Department Center   6/10/2025  9:00 AM Lea Mott, PT [facility]   6/16/2025 11:00 AM Lea Mott, PT [facility]   6/19/2025  9:00 AM Lea Mott, PT [facility]   6/24/2025  9:00 AM Lea Mott, PT [facility]   6/26/2025  9:00 AM Lea Mott, PT [facility]   7/3/2025  9:15 AM MAT LAB [facility]   7/8/2025  8:30 AM Mattie Shaffer PA-C [facility]

## 2025-06-06 ENCOUNTER — APPOINTMENT (OUTPATIENT)
Dept: PHYSICAL THERAPY | Age: 80
End: 2025-06-06
Payer: MEDICARE

## 2025-06-10 ENCOUNTER — HOSPITAL ENCOUNTER (OUTPATIENT)
Dept: PHYSICAL THERAPY | Age: 80
Setting detail: RECURRING SERIES
Discharge: HOME | End: 2025-06-13
Payer: MEDICARE

## 2025-06-10 PROCEDURE — 97110 THERAPEUTIC EXERCISES: CPT

## 2025-06-10 ASSESSMENT — PAIN SCALES - GENERAL: PAINLEVEL_OUTOF10: 0

## 2025-06-10 NOTE — PROGRESS NOTES
9:00 AM Lea Mott, PT [facility]   6/26/2025  9:00 AM Lea Mott, PT [facility]   7/3/2025  9:15 AM MAT LAB MAT [facility]   7/8/2025  8:30 AM Mattie Shaffer PA-C [facility]

## 2025-06-16 ENCOUNTER — HOSPITAL ENCOUNTER (OUTPATIENT)
Dept: PHYSICAL THERAPY | Age: 80
Setting detail: RECURRING SERIES
Discharge: HOME | End: 2025-06-19
Payer: MEDICARE

## 2025-06-16 PROCEDURE — 97110 THERAPEUTIC EXERCISES: CPT

## 2025-06-16 ASSESSMENT — PAIN SCALES - GENERAL: PAINLEVEL_OUTOF10: 0

## 2025-06-16 NOTE — PROGRESS NOTES
Albaro Guerin Darron  : 1945  Primary: [employer] (Medicare Managed)  Secondary:  [facility] [place] [facility] [place]  [place] [address]  Phone: [phone]  Fax: [phone] Plan Frequency: 2x/week    Plan of Care/Certification Expiration Date: 25        Plan of Care/Certification Expiration Date:  Plan of Care/Certification Expiration Date: 25    Frequency/Duration: Plan Frequency: 2x/week      Time In/Out:   Time In: 1103  Time Out: 1200      PT Visit Info:    Progress Note Counter: 9      Visit Count:  9    OUTPATIENT PHYSICAL THERAPY:   Treatment Note 2025       Episode  (LE weakness, left shoulder pain, balance)               Treatment Diagnosis:    No data found  Muscle weakness (generalized)  Left shoulder pain, unspecified chronicity  Other low back pain  Medical/Referring Diagnosis:    Acute pain of left shoulder [M25.512]  Degeneration of intervertebral disc of lumbar region with discogenic back pain [M51.360]  Weakness of both lower extremities [R29.898]  Referring Physician:  Mattie Shaffer PA-C MD Orders:  PT Eval and Treat   Return MD Appt:  unknown    Date of Onset:  Onset Date: 22     Allergies:   Patient has no known allergies.  Restrictions/Precautions:   None      Interventions Planned (Treatment may consist of any combination of the following):     See Assessment Note    Subjective Comments:   Pt reports that he continues to feel like his legs are getting stronger.   Initial Pain Level:     0/10  Post Session Pain Level:      0/10  Medications Last Reviewed: 2025  Updated Objective Findings:  None Today  Treatment   THERAPEUTIC EXERCISE: (55 minutes):    Exercises per grid below to improve mobility, strength, and balance.  Required minimal verbal cues to promote proper body mechanics.  Progressed resistance, range, repetitions, and complexity of movement as indicated.     2025   Activity/Exercise Parameters

## 2025-06-19 ENCOUNTER — HOSPITAL ENCOUNTER (OUTPATIENT)
Dept: PHYSICAL THERAPY | Age: 80
Setting detail: RECURRING SERIES
Discharge: HOME | End: 2025-06-22
Payer: MEDICARE

## 2025-06-19 PROCEDURE — 97110 THERAPEUTIC EXERCISES: CPT

## 2025-06-19 NOTE — PROGRESS NOTES
Albaro Guerin Darron  : 1945  Primary: [employer] (Medicare Managed)  Secondary:  [facility] [place]  [place] [address]  Phone: [phone]  Fax: [phone] Plan Frequency: 2x/week    Plan of Care/Certification Expiration Date: 25        Plan of Care/Certification Expiration Date:  Plan of Care/Certification Expiration Date: 25    Frequency/Duration: Plan Frequency: 2x/week      Time In/Out:   Time In: 0901  Time Out: 1000      PT Visit Info:    Progress Note Counter: 0      Visit Count:  10    OUTPATIENT PHYSICAL THERAPY:   Treatment Note 2025       Episode  (LE weakness, left shoulder pain, balance)               Treatment Diagnosis:    No data found  Muscle weakness (generalized)  Left shoulder pain, unspecified chronicity  Other low back pain  Medical/Referring Diagnosis:    Acute pain of left shoulder [M25.512]  Degeneration of intervertebral disc of lumbar region with discogenic back pain [M51.360]  Weakness of both lower extremities [R29.898]  Referring Physician:  Mattie Shaffer PA-C MD Orders:  PT Eval and Treat   Return MD Appt:  unknown    Date of Onset:  Onset Date: 22     Allergies:   Patient has no known allergies.  Restrictions/Precautions:   None      Interventions Planned (Treatment may consist of any combination of the following):     See Assessment Note    Subjective Comments:   Pt reports feels like his balance is improving and the plans to join [employer] after PT to continue to strengthen his body.   Initial Pain Level:      /10  Post Session Pain Level:       /10  Medications Last Reviewed: 2025  Updated Objective Findings:  None Today  Treatment   THERAPEUTIC EXERCISE: (55 minutes):    Exercises per grid below to improve mobility, strength, and balance.  Required minimal verbal cues to promote proper body mechanics.  Progressed resistance, range, repetitions, and complexity of movement as

## 2025-06-19 NOTE — PROGRESS NOTES
Albaro Rob  : 1945  Primary: [employer] (Medicare Managed)  Secondary:  [facility] [place]  [place] [address]  Phone: [phone]  Fax: [phone] Plan Frequency: 2x/week  Plan of Care/Certification Expiration Date: 25        Plan of Care/Certification Expiration Date:  Plan of Care/Certification Expiration Date: 25    Frequency/Duration: Plan Frequency: 2x/week      Time In/Out:   Time In: 0901  Time Out: 1000      PT Visit Info:    Progress Note Counter: 0      Visit Count:  10                OUTPATIENT PHYSICAL THERAPY:             Progress Report 2025               Episode (LE weakness, left shoulder pain, balance)         Treatment Diagnosis:     No data found  Medical/Referring Diagnosis:    Acute pain of left shoulder [M25.512]  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  Restrictions/Precautions:    None      Medications Last Reviewed: 2025     SUBJECTIVE   History of Injury/Illness (Reason for Referral):  Truong reports difficulty with some activities due to decreased strength in his LE's, difficulty with balance, back pain, and left shoulder pain.  He reports a long history of low back pain that almost resulted in surgery 2 years ago, but eventually resolved.  He reports DDD, disc bulges, and stenosis were diagnosed at that time.  He reports difficulty with back mobility since that time resulting in using AD for donning shoes, difficulty with standing > 10-15 minutes,  and picking up objects from the ground.  He reports noting decreased strength in his LE's over the last few years which has resulted in

## 2025-06-20 ASSESSMENT — PAIN SCALES - GENERAL: PAINLEVEL_OUTOF10: 0

## 2025-06-24 ENCOUNTER — HOSPITAL ENCOUNTER (OUTPATIENT)
Dept: PHYSICAL THERAPY | Age: 80
Setting detail: RECURRING SERIES
Discharge: HOME | End: 2025-06-27
Payer: MEDICARE

## 2025-06-24 PROCEDURE — 97110 THERAPEUTIC EXERCISES: CPT

## 2025-06-24 ASSESSMENT — PAIN SCALES - GENERAL: PAINLEVEL_OUTOF10: 0

## 2025-06-24 NOTE — PROGRESS NOTES
Albaro Guerin Darron  : 1945  Primary: [employer] (Medicare Managed)  Secondary:  [facility] [place]  [place] [address]  Phone: [phone]  Fax: [phone] Plan Frequency: 2x/week    Plan of Care/Certification Expiration Date: 25        Plan of Care/Certification Expiration Date:  Plan of Care/Certification Expiration Date: 25    Frequency/Duration: Plan Frequency: 2x/week      Time In/Out:   Time In: 0900  Time Out: 1000      PT Visit Info:    Progress Note Counter: 1      Visit Count:  11    OUTPATIENT PHYSICAL THERAPY:   Treatment Note 2025       Episode  (LE weakness, left shoulder pain, balance)               Treatment Diagnosis:    No data found  Muscle weakness (generalized)  Left shoulder pain, unspecified chronicity  Other low back pain  Medical/Referring Diagnosis:    Acute pain of left shoulder [M25.512]  Degeneration of intervertebral disc of lumbar region with discogenic back pain [M51.360]  Weakness of both lower extremities [R29.898]  Referring Physician:  Mattie Shaffer PA-C MD Orders:  PT Eval and Treat   Return MD Appt:  unknown    Date of Onset:  Onset Date: 22     Allergies:   Patient has no known allergies.  Restrictions/Precautions:   None      Interventions Planned (Treatment may consist of any combination of the following):     See Assessment Note    Subjective Comments:   Pt reports he is doing well today but still has trouble bending down all the way and getting up from the ground.   Initial Pain Level:     0/10  Post Session Pain Level:      0/10  Medications Last Reviewed: 2025  Updated Objective Findings:  None Today  Treatment   THERAPEUTIC EXERCISE: (55 minutes):    Exercises per grid below to improve mobility, strength, and balance.  Required minimal verbal cues to promote proper body mechanics.  Progressed resistance, range, repetitions, and complexity of movement as indicated.

## 2025-06-26 ENCOUNTER — HOSPITAL ENCOUNTER (OUTPATIENT)
Dept: PHYSICAL THERAPY | Age: 80
Setting detail: RECURRING SERIES
Discharge: HOME | End: 2025-06-29
Payer: MEDICARE

## 2025-06-26 PROCEDURE — 97110 THERAPEUTIC EXERCISES: CPT

## 2025-06-26 ASSESSMENT — PAIN SCALES - GENERAL: PAINLEVEL_OUTOF10: 0

## 2025-06-26 NOTE — PROGRESS NOTES
Medicare Annual Wellness Visit    Albaro Rob is here for Medicare AWV (Routine annual Medicare wellness visit.), Diabetes (6 week diabetes f/u with lab review. ), Hypertension, and Hyperlipidemia    Assessment & Plan   Medicare annual wellness visit, subsequent  Type 2 diabetes mellitus with diabetic polyneuropathy, with long-term current use of insulin (HCC)  -     empagliflozin (JARDIANCE) 25 MG tablet; Take 1 tablet by mouth daily (with breakfast), Disp-90 tablet, R-3Normal  -     Insulin Glargine-Lixisenatide (SOLIQUA) 100-33 UNT-MCG/ML SOPN; Inject 48 Units into the skin every morning, Disp-15 mL, R-5Normal  -     [facility] DIABETES FOOT EXAM  -     Comprehensive Metabolic Panel; Future  -     Hemoglobin A1C; Future  COVID-19 vaccine regimen to maintain immunity declined  Screening for prostate cancer  -     PSA Screening; Future  History of adenomatous polyp of colon  -     [facility] John Whitley MD, Gastroenterology, [facility]  Screening for colon cancer  -     [facility] John Whitley MD, Gastroenterology, [facility]  Hyperlipidemia LDL goal <100  -     Comprehensive Metabolic Panel; Future  -     Lipid Panel; Future  Essential hypertension  -     CBC with Auto Differential; Future  -     Comprehensive Metabolic Panel; Future       Patient Instructions   Explained the benefits being shown from adding a different type of exercise to his weekly routine   Monitor blood sugar 2 times/day and keep record to bring to next appointment  Continue chronic medications as prescribed  Monitor blood pressure 2-4 times/month and keep record to bring to next appointment  Discussed benefits of RSV vaccine (Abrysvo) being 88.9% protection year #1 & 77.8% year #2 against serious lower respiratory tract disease from RSV with 20% risk of symptoms including headache, muscle pain, & nausea.  This can be obtained from local pharmacy with a prescription provided by medical provider  Explained that he is eligible for Tdap booster if desired and

## 2025-06-26 NOTE — PROGRESS NOTES
Albaro Guerin Darron  : 1945  Primary: [employer] (Medicare Managed)  Secondary:  [facility] [place]  [place] [address]  Phone: [phone]  Fax: [phone] Plan Frequency: 2x/week    Plan of Care/Certification Expiration Date: 25        Plan of Care/Certification Expiration Date:  Plan of Care/Certification Expiration Date: 25    Frequency/Duration: Plan Frequency: 2x/week      Time In/Out:   Time In: 0900  Time Out: 1000      PT Visit Info:    Progress Note Counter: 1      Visit Count:  12    OUTPATIENT PHYSICAL THERAPY:   Treatment Note 2025       Episode  (LE weakness, left shoulder pain, balance)               Treatment Diagnosis:    No data found  Muscle weakness (generalized)  Left shoulder pain, unspecified chronicity  Other low back pain  Medical/Referring Diagnosis:    Acute pain of left shoulder [M25.512]  Degeneration of intervertebral disc of lumbar region with discogenic back pain [M51.360]  Weakness of both lower extremities [R29.898]  Referring Physician:  Mattie Shaffer PA-C MD Orders:  PT Eval and Treat   Return MD Appt:  unknown    Date of Onset:  Onset Date: 22     Allergies:   Patient has no known allergies.  Restrictions/Precautions:   None      Interventions Planned (Treatment may consist of any combination of the following):     See Assessment Note    Subjective Comments:   Pt reports he was sore in his left quad after his last treatment.    Initial Pain Level:     0/10  Post Session Pain Level:      0/10  Medications Last Reviewed: 2025  Updated Objective Findings:  See Discharge Note from today  Treatment   THERAPEUTIC EXERCISE: (55 minutes):    Exercises per grid below to improve mobility, strength, and balance.  Required minimal verbal cues to promote proper body mechanics.  Progressed resistance, range, repetitions, and complexity of movement as indicated.     2025   Activity/Exercise

## 2025-06-26 NOTE — PATIENT INSTRUCTIONS
every day. Try for at least 30 minutes on most days of the week.     Try to quit or cut back on using tobacco and other nicotine products. This includes smoking and vaping. If you need help quitting, talk to your doctor about stop-smoking programs and medicines. These can increase your chances of quitting for good. Quitting is one of the most important things you can do to protect your heart. It is never too late to quit. Try to avoid secondhand smoke too.     Stay at a weight that's healthy for you. Talk to your doctor if you need help losing weight.     Try to get 7 to 9 hours of sleep each night.     Limit alcohol to 2 drinks a day for men and 1 drink a day for women. Too much alcohol can cause health problems.     Manage other health problems such as diabetes, high blood pressure, and high cholesterol. If you think you may have a problem with alcohol or drug use, talk to your doctor.   Medicines    Take your medicines exactly as prescribed. Call your doctor if you think you are having a problem with your medicine.     If your doctor recommends aspirin, take the amount directed each day. Make sure you take aspirin and not another kind of pain reliever, such as acetaminophen (Tylenol).   When should you call for help?   Call 911 if you have symptoms of a heart attack. These may include:    Chest pain or pressure, or a strange feeling in the chest.     Sweating.     Shortness of breath.     Pain, pressure, or a strange feeling in the back, neck, jaw, or upper belly or in one or both shoulders or arms.     Lightheadedness or sudden weakness.     A fast or irregular heartbeat.   After you call 911, the  may tell you to chew 1 adult-strength or 2 to 4 low-dose aspirin. Wait for an ambulance. Do not try to drive yourself.  Watch closely for changes in your health, and be sure to contact your doctor if you have any problems.  Where can you learn more?  Go to https://www.healthwise.net/patientEd and enter F075 to

## 2025-06-27 ENCOUNTER — TELEPHONE (OUTPATIENT)
Dept: INTERNAL MEDICINE CLINIC | Facility: CLINIC | Age: 80
End: 2025-06-27

## 2025-06-27 NOTE — TELEPHONE ENCOUNTER
----- Message from Mattie Shaffer PA-C sent at 6/26/2025 10:35 PM EDT -----  I've been trying to reach him because I have misplaced the paper where I had notes taken from our visit.  I need to clarify if we made any medication changes at this visit or if we left everything as is?  Can you call him during the day and see if you can reach him because I have been unsuccessful several evenings?  Thanks!

## 2025-07-03 ENCOUNTER — LAB (OUTPATIENT)
Dept: INTERNAL MEDICINE CLINIC | Facility: CLINIC | Age: 80
End: 2025-07-03

## 2025-07-03 DIAGNOSIS — E11.41 TYPE 2 DIABETES MELLITUS WITH DIABETIC MONONEUROPATHY, WITH LONG-TERM CURRENT USE OF INSULIN (HCC): ICD-10-CM

## 2025-07-03 DIAGNOSIS — Z79.4 TYPE 2 DIABETES MELLITUS WITH DIABETIC MONONEUROPATHY, WITH LONG-TERM CURRENT USE OF INSULIN (HCC): ICD-10-CM

## 2025-07-03 DIAGNOSIS — I10 ESSENTIAL HYPERTENSION: ICD-10-CM

## 2025-07-03 DIAGNOSIS — E78.5 HYPERLIPIDEMIA LDL GOAL <100: ICD-10-CM

## 2025-07-03 LAB
ALBUMIN SERPL-MCNC: 3.7 G/DL (ref 3.2–4.6)
ALBUMIN/GLOB SERPL: 1.2 (ref 1–1.9)
ALP SERPL-CCNC: 68 U/L (ref 40–129)
ALT SERPL-CCNC: 37 U/L (ref 8–55)
ANION GAP SERPL CALC-SCNC: 12 MMOL/L (ref 7–16)
AST SERPL-CCNC: 30 U/L (ref 15–37)
BASOPHILS # BLD: 0.01 K/UL (ref 0–0.2)
BASOPHILS NFR BLD: 0.1 % (ref 0–2)
BILIRUB SERPL-MCNC: 0.3 MG/DL (ref 0–1.2)
BUN SERPL-MCNC: 24 MG/DL (ref 8–23)
CALCIUM SERPL-MCNC: 9.8 MG/DL (ref 8.8–10.2)
CHLORIDE SERPL-SCNC: 107 MMOL/L (ref 98–107)
CHOLEST SERPL-MCNC: 127 MG/DL (ref 0–200)
CO2 SERPL-SCNC: 22 MMOL/L (ref 20–29)
CREAT SERPL-MCNC: 1.11 MG/DL (ref 0.8–1.3)
DIFFERENTIAL METHOD BLD: NORMAL
EOSINOPHIL # BLD: 0.13 K/UL (ref 0–0.8)
EOSINOPHIL NFR BLD: 1.8 % (ref 0.5–7.8)
ERYTHROCYTE [DISTWIDTH] IN BLOOD BY AUTOMATED COUNT: 13.2 % (ref 11.9–14.6)
EST. AVERAGE GLUCOSE BLD GHB EST-MCNC: 173 MG/DL
GLOBULIN SER CALC-MCNC: 3.2 G/DL (ref 2.3–3.5)
GLUCOSE SERPL-MCNC: 149 MG/DL (ref 70–99)
HBA1C MFR BLD: 7.7 % (ref 0–5.6)
HCT VFR BLD AUTO: 44.2 % (ref 41.1–50.3)
HDLC SERPL-MCNC: 36 MG/DL (ref 40–60)
HDLC SERPL: 3.6 (ref 0–5)
HGB BLD-MCNC: 14.1 G/DL (ref 13.6–17.2)
IMM GRANULOCYTES # BLD AUTO: 0.04 K/UL (ref 0–0.5)
IMM GRANULOCYTES NFR BLD AUTO: 0.6 % (ref 0–5)
LDLC SERPL CALC-MCNC: 62 MG/DL (ref 0–100)
LYMPHOCYTES # BLD: 2.33 K/UL (ref 0.5–4.6)
LYMPHOCYTES NFR BLD: 32.2 % (ref 13–44)
MCH RBC QN AUTO: 31.6 PG (ref 26.1–32.9)
MCHC RBC AUTO-ENTMCNC: 31.9 G/DL (ref 31.4–35)
MCV RBC AUTO: 99.1 FL (ref 82–102)
MONOCYTES # BLD: 0.56 K/UL (ref 0.1–1.3)
MONOCYTES NFR BLD: 7.7 % (ref 4–12)
NEUTS SEG # BLD: 4.17 K/UL (ref 1.7–8.2)
NEUTS SEG NFR BLD: 57.6 % (ref 43–78)
NRBC # BLD: 0 K/UL (ref 0–0.2)
PLATELET # BLD AUTO: 217 K/UL (ref 150–450)
PMV BLD AUTO: 10.1 FL (ref 9.4–12.3)
POTASSIUM SERPL-SCNC: 4.6 MMOL/L (ref 3.5–5.1)
PROT SERPL-MCNC: 6.9 G/DL (ref 6.3–8.2)
RBC # BLD AUTO: 4.46 M/UL (ref 4.23–5.6)
SODIUM SERPL-SCNC: 141 MMOL/L (ref 136–145)
TRIGL SERPL-MCNC: 147 MG/DL (ref 0–150)
VLDLC SERPL CALC-MCNC: 29 MG/DL (ref 6–23)
WBC # BLD AUTO: 7.2 K/UL (ref 4.3–11.1)

## 2025-07-06 SDOH — HEALTH STABILITY: PHYSICAL HEALTH: ON AVERAGE, HOW MANY DAYS PER WEEK DO YOU ENGAGE IN MODERATE TO STRENUOUS EXERCISE (LIKE A BRISK WALK)?: 6 DAYS

## 2025-07-06 SDOH — HEALTH STABILITY: PHYSICAL HEALTH: ON AVERAGE, HOW MANY MINUTES DO YOU ENGAGE IN EXERCISE AT THIS LEVEL?: 30 MIN

## 2025-07-06 ASSESSMENT — LIFESTYLE VARIABLES
HOW OFTEN DO YOU HAVE SIX OR MORE DRINKS ON ONE OCCASION: 1
HOW MANY STANDARD DRINKS CONTAINING ALCOHOL DO YOU HAVE ON A TYPICAL DAY: 0
HOW OFTEN DO YOU HAVE A DRINK CONTAINING ALCOHOL: 1
HOW MANY STANDARD DRINKS CONTAINING ALCOHOL DO YOU HAVE ON A TYPICAL DAY: PATIENT DOES NOT DRINK
HOW OFTEN DO YOU HAVE A DRINK CONTAINING ALCOHOL: NEVER

## 2025-07-06 ASSESSMENT — PATIENT HEALTH QUESTIONNAIRE - PHQ9
SUM OF ALL RESPONSES TO PHQ QUESTIONS 1-9: 0
2. FEELING DOWN, DEPRESSED OR HOPELESS: NOT AT ALL
SUM OF ALL RESPONSES TO PHQ QUESTIONS 1-9: 0
1. LITTLE INTEREST OR PLEASURE IN DOING THINGS: NOT AT ALL

## 2025-07-08 ENCOUNTER — OFFICE VISIT (OUTPATIENT)
Dept: INTERNAL MEDICINE CLINIC | Facility: CLINIC | Age: 80
End: 2025-07-08
Payer: MEDICARE

## 2025-07-08 ENCOUNTER — RESULTS FOLLOW-UP (OUTPATIENT)
Dept: INTERNAL MEDICINE CLINIC | Facility: CLINIC | Age: 80
End: 2025-07-08

## 2025-07-08 VITALS
OXYGEN SATURATION: 96 % | WEIGHT: 230 LBS | DIASTOLIC BLOOD PRESSURE: 80 MMHG | HEART RATE: 72 BPM | HEIGHT: 68 IN | BODY MASS INDEX: 34.86 KG/M2 | TEMPERATURE: 97.2 F | SYSTOLIC BLOOD PRESSURE: 138 MMHG

## 2025-07-08 DIAGNOSIS — Z28.21 COVID-19 VACCINE REGIMEN TO MAINTAIN IMMUNITY DECLINED: ICD-10-CM

## 2025-07-08 DIAGNOSIS — I10 ESSENTIAL HYPERTENSION: ICD-10-CM

## 2025-07-08 DIAGNOSIS — Z79.4 TYPE 2 DIABETES MELLITUS WITH DIABETIC POLYNEUROPATHY, WITH LONG-TERM CURRENT USE OF INSULIN (HCC): ICD-10-CM

## 2025-07-08 DIAGNOSIS — Z00.00 MEDICARE ANNUAL WELLNESS VISIT, SUBSEQUENT: Primary | ICD-10-CM

## 2025-07-08 DIAGNOSIS — E11.42 TYPE 2 DIABETES MELLITUS WITH DIABETIC POLYNEUROPATHY, WITH LONG-TERM CURRENT USE OF INSULIN (HCC): ICD-10-CM

## 2025-07-08 DIAGNOSIS — Z12.5 SCREENING FOR PROSTATE CANCER: ICD-10-CM

## 2025-07-08 DIAGNOSIS — Z12.11 SCREENING FOR COLON CANCER: ICD-10-CM

## 2025-07-08 DIAGNOSIS — E78.5 HYPERLIPIDEMIA LDL GOAL <100: ICD-10-CM

## 2025-07-08 DIAGNOSIS — Z86.0101 HISTORY OF ADENOMATOUS POLYP OF COLON: ICD-10-CM

## 2025-07-08 PROCEDURE — 1123F ACP DISCUSS/DSCN MKR DOCD: CPT | Performed by: PHYSICIAN ASSISTANT

## 2025-07-08 PROCEDURE — 99214 OFFICE O/P EST MOD 30 MIN: CPT | Performed by: PHYSICIAN ASSISTANT

## 2025-07-08 PROCEDURE — 1159F MED LIST DOCD IN RCRD: CPT | Performed by: PHYSICIAN ASSISTANT

## 2025-07-08 PROCEDURE — G0439 PPPS, SUBSEQ VISIT: HCPCS | Performed by: PHYSICIAN ASSISTANT

## 2025-07-08 PROCEDURE — 3075F SYST BP GE 130 - 139MM HG: CPT | Performed by: PHYSICIAN ASSISTANT

## 2025-07-08 PROCEDURE — 3078F DIAST BP <80 MM HG: CPT | Performed by: PHYSICIAN ASSISTANT

## 2025-07-08 PROCEDURE — 1125F AMNT PAIN NOTED PAIN PRSNT: CPT | Performed by: PHYSICIAN ASSISTANT

## 2025-07-08 PROCEDURE — G2211 COMPLEX E/M VISIT ADD ON: HCPCS | Performed by: PHYSICIAN ASSISTANT

## 2025-07-08 PROCEDURE — 3051F HG A1C>EQUAL 7.0%<8.0%: CPT | Performed by: PHYSICIAN ASSISTANT

## 2025-07-08 RX ORDER — INSULIN GLARGINE AND LIXISENATIDE 100; 33 U/ML; UG/ML
48 INJECTION, SOLUTION SUBCUTANEOUS EVERY MORNING
Qty: 15 ML | Refills: 5 | Status: SHIPPED | OUTPATIENT
Start: 2025-07-08

## 2025-07-09 ENCOUNTER — TELEPHONE (OUTPATIENT)
Dept: INTERNAL MEDICINE CLINIC | Facility: CLINIC | Age: 80
End: 2025-07-09

## 2025-07-09 NOTE — TELEPHONE ENCOUNTER
----- Message from Mattie Shaffer PA-C sent at 7/8/2025  7:31 PM EDT -----  Please notify patient that we found his last colonoscopy report from 2016 and there was a precancerous polyp removed.  Because of this he was due back in 2021 so we are a bit overdue and I will be placing a referral to GI so they can discuss risk and benefits of repeating colonoscopy at this point so he can make an informed decision.

## 2025-08-07 ENCOUNTER — OFFICE VISIT (OUTPATIENT)
Age: 80
End: 2025-08-07
Payer: MEDICARE

## 2025-08-07 VITALS
DIASTOLIC BLOOD PRESSURE: 80 MMHG | HEART RATE: 75 BPM | RESPIRATION RATE: 16 BRPM | OXYGEN SATURATION: 95 % | SYSTOLIC BLOOD PRESSURE: 152 MMHG | WEIGHT: 233.4 LBS | HEIGHT: 68 IN | BODY MASS INDEX: 35.37 KG/M2

## 2025-08-07 DIAGNOSIS — Z86.0101 HISTORY OF ADENOMATOUS POLYP OF COLON: Primary | ICD-10-CM

## 2025-08-07 PROCEDURE — 1159F MED LIST DOCD IN RCRD: CPT | Performed by: INTERNAL MEDICINE

## 2025-08-07 PROCEDURE — 1123F ACP DISCUSS/DSCN MKR DOCD: CPT | Performed by: INTERNAL MEDICINE

## 2025-08-07 PROCEDURE — 3079F DIAST BP 80-89 MM HG: CPT | Performed by: INTERNAL MEDICINE

## 2025-08-07 PROCEDURE — 3077F SYST BP >= 140 MM HG: CPT | Performed by: INTERNAL MEDICINE

## 2025-08-07 PROCEDURE — 99204 OFFICE O/P NEW MOD 45 MIN: CPT | Performed by: INTERNAL MEDICINE
